# Patient Record
Sex: FEMALE | Race: WHITE | NOT HISPANIC OR LATINO
[De-identification: names, ages, dates, MRNs, and addresses within clinical notes are randomized per-mention and may not be internally consistent; named-entity substitution may affect disease eponyms.]

---

## 2018-02-09 ENCOUNTER — TRANSCRIPTION ENCOUNTER (OUTPATIENT)
Age: 38
End: 2018-02-09

## 2018-02-27 ENCOUNTER — APPOINTMENT (OUTPATIENT)
Dept: SURGERY | Facility: CLINIC | Age: 38
End: 2018-02-27
Payer: SELF-PAY

## 2018-02-27 VITALS
DIASTOLIC BLOOD PRESSURE: 96 MMHG | WEIGHT: 243.8 LBS | SYSTOLIC BLOOD PRESSURE: 150 MMHG | BODY MASS INDEX: 39.18 KG/M2 | HEIGHT: 66 IN

## 2018-02-27 PROCEDURE — SI006: CPT

## 2018-03-17 ENCOUNTER — TRANSCRIPTION ENCOUNTER (OUTPATIENT)
Age: 38
End: 2018-03-17

## 2018-03-23 ENCOUNTER — APPOINTMENT (OUTPATIENT)
Dept: SURGERY | Facility: CLINIC | Age: 38
End: 2018-03-23
Payer: COMMERCIAL

## 2018-03-23 VITALS
DIASTOLIC BLOOD PRESSURE: 90 MMHG | HEIGHT: 66 IN | WEIGHT: 240 LBS | BODY MASS INDEX: 38.57 KG/M2 | SYSTOLIC BLOOD PRESSURE: 146 MMHG

## 2018-03-23 DIAGNOSIS — Z87.01 PERSONAL HISTORY OF PNEUMONIA (RECURRENT): ICD-10-CM

## 2018-03-23 DIAGNOSIS — Z86.39 PERSONAL HISTORY OF OTHER ENDOCRINE, NUTRITIONAL AND METABOLIC DISEASE: ICD-10-CM

## 2018-03-23 PROCEDURE — 99244 OFF/OP CNSLTJ NEW/EST MOD 40: CPT

## 2018-03-23 RX ORDER — ALPRAZOLAM 1 MG/1
1 TABLET ORAL
Refills: 0 | Status: ACTIVE | COMMUNITY

## 2018-03-24 ENCOUNTER — OUTPATIENT (OUTPATIENT)
Dept: OUTPATIENT SERVICES | Facility: HOSPITAL | Age: 38
LOS: 1 days | Discharge: HOME | End: 2018-03-24

## 2018-03-24 DIAGNOSIS — R53.83 OTHER FATIGUE: ICD-10-CM

## 2018-03-24 DIAGNOSIS — E66.01 MORBID (SEVERE) OBESITY DUE TO EXCESS CALORIES: ICD-10-CM

## 2018-03-24 DIAGNOSIS — I10 ESSENTIAL (PRIMARY) HYPERTENSION: ICD-10-CM

## 2018-03-24 DIAGNOSIS — R16.0 HEPATOMEGALY, NOT ELSEWHERE CLASSIFIED: ICD-10-CM

## 2018-03-24 DIAGNOSIS — E07.9 DISORDER OF THYROID, UNSPECIFIED: ICD-10-CM

## 2018-03-27 ENCOUNTER — RESULT REVIEW (OUTPATIENT)
Age: 38
End: 2018-03-27

## 2018-03-27 LAB
25(OH)D3 SERPL-MCNC: 7.9 NG/ML
ALBUMIN SERPL ELPH-MCNC: 4.8 G/DL
ALP BLD-CCNC: 89 U/L
ALT SERPL-CCNC: 48 U/L
ANION GAP SERPL CALC-SCNC: 13 MMOL/L
AST SERPL-CCNC: 32 U/L
BASOPHILS # BLD AUTO: 0.02 K/UL
BASOPHILS NFR BLD AUTO: 0.2 %
BILIRUB SERPL-MCNC: 0.6 MG/DL
BUN SERPL-MCNC: 12 MG/DL
CALCIUM SERPL-MCNC: 10.1 MG/DL
CALCIUM SERPL-MCNC: 9.5 MG/DL
CHLORIDE SERPL-SCNC: 101 MMOL/L
CHOLEST SERPL-MCNC: 187 MG/DL
CHOLEST/HDLC SERPL: 4.2 RATIO
CO2 SERPL-SCNC: 26 MMOL/L
CREAT SERPL-MCNC: 0.8 MG/DL
EOSINOPHIL # BLD AUTO: 0.16 K/UL
EOSINOPHIL NFR BLD AUTO: 1.8 %
FERRITIN SERPL-MCNC: 51 NG/ML
FOLATE RBC-MCNC: 1011 NG/ML
GLUCOSE SERPL-MCNC: 101 MG/DL
HCT VFR BLD CALC: 45 %
HCT VFR BLD CALC: 46.8 %
HDLC SERPL-MCNC: 45 MG/DL
HGB BLD-MCNC: 14.3 G/DL
IMM GRANULOCYTES NFR BLD AUTO: 0.2 %
IRON SATN MFR SERPL: 30 %
IRON SERPL-MCNC: 103 UG/DL
LDLC SERPL CALC-MCNC: 124 MG/DL
LYMPHOCYTES # BLD AUTO: 2.97 K/UL
LYMPHOCYTES NFR BLD AUTO: 32.5 %
MAN DIFF?: NORMAL
MCHC RBC-ENTMCNC: 29.1 PG
MCHC RBC-ENTMCNC: 30.6 GM/DL
MCV RBC AUTO: 95.1 FL
MONOCYTES # BLD AUTO: 0.61 K/UL
MONOCYTES NFR BLD AUTO: 6.7 %
NEUTROPHILS # BLD AUTO: 5.36 K/UL
NEUTROPHILS NFR BLD AUTO: 58.6 %
PARATHYROID HORMONE INTACT: 63 PG/ML
PLATELET # BLD AUTO: 281 K/UL
POTASSIUM SERPL-SCNC: 4.9 MMOL/L
PROT SERPL-MCNC: 7.5 G/DL
RBC # BLD: 4.92 M/UL
RBC # FLD: 14.1 %
SODIUM SERPL-SCNC: 140 MMOL/L
T3FREE SERPL-MCNC: 3.56 PG/ML
T4 FREE SERPL-MCNC: 1.2 NG/DL
TIBC SERPL-MCNC: 338 UG/DL
TRIGL SERPL-MCNC: 183 MG/DL
TSH SERPL-ACNC: 3 UIU/ML
UIBC SERPL-MCNC: 235 UG/DL
VIT B12 SERPL-MCNC: 605 PG/ML
WBC # FLD AUTO: 9.14 K/UL

## 2018-03-28 ENCOUNTER — OTHER (OUTPATIENT)
Age: 38
End: 2018-03-28

## 2018-04-02 LAB — VIT B1 SERPL-MCNC: 158.2 NMOL/L

## 2018-04-03 ENCOUNTER — APPOINTMENT (OUTPATIENT)
Dept: SURGERY | Facility: CLINIC | Age: 38
End: 2018-04-03

## 2018-06-11 ENCOUNTER — OTHER (OUTPATIENT)
Age: 38
End: 2018-06-11

## 2018-06-27 ENCOUNTER — OTHER (OUTPATIENT)
Age: 38
End: 2018-06-27

## 2018-06-27 ENCOUNTER — APPOINTMENT (OUTPATIENT)
Dept: SURGERY | Facility: CLINIC | Age: 38
End: 2018-06-27

## 2018-06-27 ENCOUNTER — APPOINTMENT (OUTPATIENT)
Dept: SURGERY | Facility: CLINIC | Age: 38
End: 2018-06-27
Payer: COMMERCIAL

## 2018-06-27 VITALS — WEIGHT: 240 LBS | BODY MASS INDEX: 38.57 KG/M2 | HEIGHT: 66 IN

## 2018-06-27 PROCEDURE — 97802 MEDICAL NUTRITION INDIV IN: CPT

## 2018-07-17 ENCOUNTER — APPOINTMENT (OUTPATIENT)
Dept: SURGERY | Facility: CLINIC | Age: 38
End: 2018-07-17
Payer: COMMERCIAL

## 2018-07-17 VITALS — HEIGHT: 66 IN | BODY MASS INDEX: 38.09 KG/M2 | WEIGHT: 237 LBS

## 2018-07-17 PROCEDURE — 99212 OFFICE O/P EST SF 10 MIN: CPT

## 2018-07-30 ENCOUNTER — RX RENEWAL (OUTPATIENT)
Age: 38
End: 2018-07-30

## 2018-11-21 ENCOUNTER — TRANSCRIPTION ENCOUNTER (OUTPATIENT)
Age: 38
End: 2018-11-21

## 2018-12-11 ENCOUNTER — OTHER (OUTPATIENT)
Age: 38
End: 2018-12-11

## 2018-12-18 ENCOUNTER — OUTPATIENT (OUTPATIENT)
Dept: OUTPATIENT SERVICES | Facility: HOSPITAL | Age: 38
LOS: 1 days | Discharge: HOME | End: 2018-12-18

## 2018-12-18 ENCOUNTER — APPOINTMENT (OUTPATIENT)
Dept: SURGERY | Facility: CLINIC | Age: 38
End: 2018-12-18
Payer: COMMERCIAL

## 2018-12-18 VITALS — WEIGHT: 237 LBS | BODY MASS INDEX: 38.09 KG/M2 | HEIGHT: 66 IN

## 2018-12-18 DIAGNOSIS — E55.9 VITAMIN D DEFICIENCY, UNSPECIFIED: ICD-10-CM

## 2018-12-18 PROCEDURE — 99212 OFFICE O/P EST SF 10 MIN: CPT

## 2018-12-18 RX ORDER — ERGOCALCIFEROL 1.25 MG/1
1.25 MG CAPSULE, LIQUID FILLED ORAL
Qty: 4 | Refills: 3 | Status: DISCONTINUED | COMMUNITY
Start: 2018-03-27 | End: 2018-12-18

## 2018-12-18 RX ORDER — BUPROPION HYDROCHLORIDE 150 MG/1
150 TABLET, EXTENDED RELEASE ORAL DAILY
Refills: 0 | Status: DISCONTINUED | COMMUNITY
End: 2018-09-18

## 2018-12-21 ENCOUNTER — RESULT REVIEW (OUTPATIENT)
Age: 38
End: 2018-12-21

## 2018-12-21 LAB — 25(OH)D3 SERPL-MCNC: 19 NG/ML

## 2019-01-21 ENCOUNTER — OUTPATIENT (OUTPATIENT)
Dept: OUTPATIENT SERVICES | Facility: HOSPITAL | Age: 39
LOS: 1 days | Discharge: HOME | End: 2019-01-21

## 2019-01-21 VITALS
HEART RATE: 68 BPM | DIASTOLIC BLOOD PRESSURE: 78 MMHG | WEIGHT: 246.92 LBS | RESPIRATION RATE: 16 BRPM | HEIGHT: 66 IN | OXYGEN SATURATION: 100 % | SYSTOLIC BLOOD PRESSURE: 168 MMHG | TEMPERATURE: 97 F

## 2019-01-21 DIAGNOSIS — Z01.818 ENCOUNTER FOR OTHER PREPROCEDURAL EXAMINATION: ICD-10-CM

## 2019-01-21 DIAGNOSIS — Z98.890 OTHER SPECIFIED POSTPROCEDURAL STATES: Chronic | ICD-10-CM

## 2019-01-21 DIAGNOSIS — Z90.89 ACQUIRED ABSENCE OF OTHER ORGANS: Chronic | ICD-10-CM

## 2019-01-21 DIAGNOSIS — E66.01 MORBID (SEVERE) OBESITY DUE TO EXCESS CALORIES: ICD-10-CM

## 2019-01-21 LAB
ALBUMIN SERPL ELPH-MCNC: 5.1 G/DL — SIGNIFICANT CHANGE UP (ref 3.5–5.2)
ALP SERPL-CCNC: 110 U/L — SIGNIFICANT CHANGE UP (ref 30–115)
ALT FLD-CCNC: 51 U/L — HIGH (ref 0–41)
ANION GAP SERPL CALC-SCNC: 18 MMOL/L — HIGH (ref 7–14)
APPEARANCE UR: CLEAR — SIGNIFICANT CHANGE UP
APTT BLD: 34 SEC — SIGNIFICANT CHANGE UP (ref 27–39.2)
AST SERPL-CCNC: 48 U/L — HIGH (ref 0–41)
BASOPHILS # BLD AUTO: 0.04 K/UL — SIGNIFICANT CHANGE UP (ref 0–0.2)
BASOPHILS NFR BLD AUTO: 0.5 % — SIGNIFICANT CHANGE UP (ref 0–1)
BILIRUB SERPL-MCNC: 0.5 MG/DL — SIGNIFICANT CHANGE UP (ref 0.2–1.2)
BILIRUB UR-MCNC: NEGATIVE — SIGNIFICANT CHANGE UP
BLD GP AB SCN SERPL QL: SIGNIFICANT CHANGE UP
BUN SERPL-MCNC: 12 MG/DL — SIGNIFICANT CHANGE UP (ref 10–20)
CALCIUM SERPL-MCNC: 10.2 MG/DL — HIGH (ref 8.5–10.1)
CHLORIDE SERPL-SCNC: 96 MMOL/L — LOW (ref 98–110)
CO2 SERPL-SCNC: 23 MMOL/L — SIGNIFICANT CHANGE UP (ref 17–32)
COLOR SPEC: YELLOW — SIGNIFICANT CHANGE UP
CREAT SERPL-MCNC: 0.8 MG/DL — SIGNIFICANT CHANGE UP (ref 0.7–1.5)
DIFF PNL FLD: NEGATIVE — SIGNIFICANT CHANGE UP
EOSINOPHIL # BLD AUTO: 0.14 K/UL — SIGNIFICANT CHANGE UP (ref 0–0.7)
EOSINOPHIL NFR BLD AUTO: 1.7 % — SIGNIFICANT CHANGE UP (ref 0–8)
ESTIMATED AVERAGE GLUCOSE: 186 MG/DL — HIGH (ref 68–114)
GLUCOSE SERPL-MCNC: 101 MG/DL — HIGH (ref 70–99)
GLUCOSE UR QL: NEGATIVE MG/DL — SIGNIFICANT CHANGE UP
HBA1C BLD-MCNC: 8.1 % — HIGH (ref 4–5.6)
HCT VFR BLD CALC: 45.4 % — SIGNIFICANT CHANGE UP (ref 37–47)
HGB BLD-MCNC: 14.7 G/DL — SIGNIFICANT CHANGE UP (ref 12–16)
IMM GRANULOCYTES NFR BLD AUTO: 0.2 % — SIGNIFICANT CHANGE UP (ref 0.1–0.3)
INR BLD: 1 RATIO — SIGNIFICANT CHANGE UP (ref 0.65–1.3)
KETONES UR-MCNC: NEGATIVE — SIGNIFICANT CHANGE UP
LEUKOCYTE ESTERASE UR-ACNC: NEGATIVE — SIGNIFICANT CHANGE UP
LYMPHOCYTES # BLD AUTO: 2.81 K/UL — SIGNIFICANT CHANGE UP (ref 1.2–3.4)
LYMPHOCYTES # BLD AUTO: 33.5 % — SIGNIFICANT CHANGE UP (ref 20.5–51.1)
MCHC RBC-ENTMCNC: 27.9 PG — SIGNIFICANT CHANGE UP (ref 27–31)
MCHC RBC-ENTMCNC: 32.4 G/DL — SIGNIFICANT CHANGE UP (ref 32–37)
MCV RBC AUTO: 86.3 FL — SIGNIFICANT CHANGE UP (ref 81–99)
MONOCYTES # BLD AUTO: 0.58 K/UL — SIGNIFICANT CHANGE UP (ref 0.1–0.6)
MONOCYTES NFR BLD AUTO: 6.9 % — SIGNIFICANT CHANGE UP (ref 1.7–9.3)
NEUTROPHILS # BLD AUTO: 4.8 K/UL — SIGNIFICANT CHANGE UP (ref 1.4–6.5)
NEUTROPHILS NFR BLD AUTO: 57.2 % — SIGNIFICANT CHANGE UP (ref 42.2–75.2)
NITRITE UR-MCNC: NEGATIVE — SIGNIFICANT CHANGE UP
NRBC # BLD: 0 /100 WBCS — SIGNIFICANT CHANGE UP (ref 0–0)
PH UR: 6 — SIGNIFICANT CHANGE UP (ref 5–8)
PLATELET # BLD AUTO: 263 K/UL — SIGNIFICANT CHANGE UP (ref 130–400)
POTASSIUM SERPL-MCNC: 4.3 MMOL/L — SIGNIFICANT CHANGE UP (ref 3.5–5)
POTASSIUM SERPL-SCNC: 4.3 MMOL/L — SIGNIFICANT CHANGE UP (ref 3.5–5)
PROT SERPL-MCNC: 8 G/DL — SIGNIFICANT CHANGE UP (ref 6–8)
PROT UR-MCNC: NEGATIVE MG/DL — SIGNIFICANT CHANGE UP
PROTHROM AB SERPL-ACNC: 11.5 SEC — SIGNIFICANT CHANGE UP (ref 9.95–12.87)
RBC # BLD: 5.26 M/UL — SIGNIFICANT CHANGE UP (ref 4.2–5.4)
RBC # FLD: 12.7 % — SIGNIFICANT CHANGE UP (ref 11.5–14.5)
SODIUM SERPL-SCNC: 137 MMOL/L — SIGNIFICANT CHANGE UP (ref 135–146)
SP GR SPEC: 1.01 — SIGNIFICANT CHANGE UP (ref 1.01–1.03)
TYPE + AB SCN PNL BLD: SIGNIFICANT CHANGE UP
UROBILINOGEN FLD QL: 0.2 MG/DL — SIGNIFICANT CHANGE UP (ref 0.2–0.2)
WBC # BLD: 8.39 K/UL — SIGNIFICANT CHANGE UP (ref 4.8–10.8)
WBC # FLD AUTO: 8.39 K/UL — SIGNIFICANT CHANGE UP (ref 4.8–10.8)

## 2019-01-21 NOTE — H&P PST ADULT - MALLAMPATI CLASS
alessia crowded airway short thick neck/Class IV (difficult) - the soft palate is not visible at all

## 2019-01-21 NOTE — H&P PST ADULT - PMH
Anxiety    Diabetes  1 yr  GERD (gastroesophageal reflux disease)    HTN (hypertension)    Sleep apnea  on cpap  Thyroid disease  hashimotos no meds clinically euthyroid 1 yr

## 2019-01-21 NOTE — H&P PST ADULT - REASON FOR ADMISSION
37 yr old female to past for laparoscopic sleeve gastrectomy possible open procedure possible intraoperative endoscopy> Pt with h/o morbid obesity began bariatric eval last 1 yr now for this sx. Pt states no recent fever no cough no uri uti no c/o cp palp sob or pain at this time. Pt denies n/v/d or abd issues Pt states exercise tolerance is 2 flights no changes Pt dx with alessia has cpap inst to bring machine dos

## 2019-01-21 NOTE — H&P PST ADULT - OTHER CARE PROVIDERS
bariatric kt on chart pulm dr hurst sleep study alessia cpap in use cardio dr ward echo est on chart gi maile egd gerd

## 2019-01-22 DIAGNOSIS — I10 ESSENTIAL (PRIMARY) HYPERTENSION: ICD-10-CM

## 2019-01-22 DIAGNOSIS — E66.9 OBESITY, UNSPECIFIED: ICD-10-CM

## 2019-01-22 DIAGNOSIS — G47.30 SLEEP APNEA, UNSPECIFIED: ICD-10-CM

## 2019-01-22 DIAGNOSIS — K21.9 GASTRO-ESOPHAGEAL REFLUX DISEASE WITHOUT ESOPHAGITIS: ICD-10-CM

## 2019-01-22 DIAGNOSIS — I49.9 CARDIAC ARRHYTHMIA, UNSPECIFIED: ICD-10-CM

## 2019-01-23 ENCOUNTER — APPOINTMENT (OUTPATIENT)
Dept: SURGERY | Facility: CLINIC | Age: 39
End: 2019-01-23
Payer: COMMERCIAL

## 2019-01-23 VITALS
BODY MASS INDEX: 37.9 KG/M2 | HEIGHT: 66 IN | SYSTOLIC BLOOD PRESSURE: 128 MMHG | DIASTOLIC BLOOD PRESSURE: 76 MMHG | WEIGHT: 235.8 LBS

## 2019-01-23 DIAGNOSIS — Z86.79 PERSONAL HISTORY OF OTHER DISEASES OF THE CIRCULATORY SYSTEM: ICD-10-CM

## 2019-01-23 PROCEDURE — 99215 OFFICE O/P EST HI 40 MIN: CPT

## 2019-01-23 NOTE — REASON FOR VISIT
[Follow-Up Visit] : a follow-up visit for [Other___] : [unfilled] [FreeTextEntry2] : Patient presents for Preoperative Bariatric visit.

## 2019-01-23 NOTE — ASSESSMENT
[FreeTextEntry1] : NISA KEEN is a 38 year female seen today for Preoperative Bariatric visit. All medications were reviewed with the patient and instructions were given in respect to medications on the day of surgery.  Written instructions provided.\par

## 2019-01-23 NOTE — PLAN
[FreeTextEntry1] : PLAN:  UGI on 2/5/2019.\par             Return for postop visit.\par             Call with concerns.

## 2019-01-23 NOTE — ADDENDUM
[FreeTextEntry1] : Patient seen, examined and reviewed with practitioner.  I agree with the assessment and plan.\par Risks, benefits and alternatives to the procedure were discussed with the patient and questions answered.  The patient is ready to proceed with surgery.\par

## 2019-01-23 NOTE — HISTORY OF PRESENT ILLNESS
[de-identified] : She is scheduled for Laparoscopic Sleeve Gastrectomy on 1/28/2019.  The patient states that she has been overweight for several years and has tried multiple weight loss modalities in the past without any long-term sustainable weight loss.

## 2019-01-28 ENCOUNTER — RESULT REVIEW (OUTPATIENT)
Age: 39
End: 2019-01-28

## 2019-01-28 ENCOUNTER — INPATIENT (INPATIENT)
Facility: HOSPITAL | Age: 39
LOS: 0 days | Discharge: HOME | End: 2019-01-29
Attending: SURGERY | Admitting: SURGERY
Payer: MEDICAID

## 2019-01-28 ENCOUNTER — APPOINTMENT (OUTPATIENT)
Dept: SURGERY | Facility: HOSPITAL | Age: 39
End: 2019-01-28
Payer: COMMERCIAL

## 2019-01-28 VITALS
RESPIRATION RATE: 20 BRPM | TEMPERATURE: 98 F | HEART RATE: 88 BPM | WEIGHT: 235.01 LBS | SYSTOLIC BLOOD PRESSURE: 126 MMHG | DIASTOLIC BLOOD PRESSURE: 92 MMHG | HEIGHT: 66 IN

## 2019-01-28 DIAGNOSIS — Z98.890 OTHER SPECIFIED POSTPROCEDURAL STATES: Chronic | ICD-10-CM

## 2019-01-28 DIAGNOSIS — Z90.89 ACQUIRED ABSENCE OF OTHER ORGANS: Chronic | ICD-10-CM

## 2019-01-28 LAB
GLUCOSE BLDC GLUCOMTR-MCNC: 129 MG/DL — HIGH (ref 70–99)
GLUCOSE BLDC GLUCOMTR-MCNC: 139 MG/DL — HIGH (ref 70–99)
GLUCOSE BLDC GLUCOMTR-MCNC: 150 MG/DL — HIGH (ref 70–99)
GLUCOSE BLDC GLUCOMTR-MCNC: 159 MG/DL — HIGH (ref 70–99)

## 2019-01-28 PROCEDURE — 43775 LAP SLEEVE GASTRECTOMY: CPT

## 2019-01-28 PROCEDURE — 99223 1ST HOSP IP/OBS HIGH 75: CPT

## 2019-01-28 PROCEDURE — 43775 LAP SLEEVE GASTRECTOMY: CPT | Mod: 82

## 2019-01-28 RX ORDER — ONDANSETRON 8 MG/1
4 TABLET, FILM COATED ORAL ONCE
Refills: 0 | Status: COMPLETED | OUTPATIENT
Start: 2019-01-28 | End: 2019-01-28

## 2019-01-28 RX ORDER — SODIUM CHLORIDE 9 MG/ML
1000 INJECTION, SOLUTION INTRAVENOUS
Refills: 0 | Status: DISCONTINUED | OUTPATIENT
Start: 2019-01-28 | End: 2019-01-29

## 2019-01-28 RX ORDER — GLUCAGON INJECTION, SOLUTION 0.5 MG/.1ML
1 INJECTION, SOLUTION SUBCUTANEOUS ONCE
Refills: 0 | Status: DISCONTINUED | OUTPATIENT
Start: 2019-01-28 | End: 2019-01-29

## 2019-01-28 RX ORDER — KETOROLAC TROMETHAMINE 30 MG/ML
15 SYRINGE (ML) INJECTION EVERY 6 HOURS
Refills: 0 | Status: DISCONTINUED | OUTPATIENT
Start: 2019-01-28 | End: 2019-01-29

## 2019-01-28 RX ORDER — HEPARIN SODIUM 5000 [USP'U]/ML
5000 INJECTION INTRAVENOUS; SUBCUTANEOUS EVERY 8 HOURS
Refills: 0 | Status: DISCONTINUED | OUTPATIENT
Start: 2019-01-28 | End: 2019-01-29

## 2019-01-28 RX ORDER — DEXAMETHASONE 0.5 MG/5ML
4 ELIXIR ORAL ONCE
Refills: 0 | Status: COMPLETED | OUTPATIENT
Start: 2019-01-28 | End: 2019-01-28

## 2019-01-28 RX ORDER — PANTOPRAZOLE SODIUM 20 MG/1
40 TABLET, DELAYED RELEASE ORAL EVERY 24 HOURS
Refills: 0 | Status: DISCONTINUED | OUTPATIENT
Start: 2019-01-28 | End: 2019-01-29

## 2019-01-28 RX ORDER — INSULIN LISPRO 100/ML
VIAL (ML) SUBCUTANEOUS EVERY 6 HOURS
Refills: 0 | Status: DISCONTINUED | OUTPATIENT
Start: 2019-01-28 | End: 2019-01-29

## 2019-01-28 RX ORDER — ONDANSETRON 8 MG/1
4 TABLET, FILM COATED ORAL EVERY 8 HOURS
Refills: 0 | Status: DISCONTINUED | OUTPATIENT
Start: 2019-01-28 | End: 2019-01-28

## 2019-01-28 RX ORDER — ONDANSETRON 8 MG/1
4 TABLET, FILM COATED ORAL EVERY 6 HOURS
Refills: 0 | Status: DISCONTINUED | OUTPATIENT
Start: 2019-01-28 | End: 2019-01-29

## 2019-01-28 RX ORDER — NALOXONE HYDROCHLORIDE 4 MG/.1ML
0.1 SPRAY NASAL
Refills: 0 | Status: DISCONTINUED | OUTPATIENT
Start: 2019-01-28 | End: 2019-01-28

## 2019-01-28 RX ORDER — CEFOTETAN DISODIUM 1 G
2 VIAL (EA) INJECTION EVERY 12 HOURS
Refills: 0 | Status: COMPLETED | OUTPATIENT
Start: 2019-01-28 | End: 2019-01-29

## 2019-01-28 RX ORDER — BUPIVACAINE 13.3 MG/ML
20 INJECTION, SUSPENSION, LIPOSOMAL INFILTRATION ONCE
Refills: 0 | Status: DISCONTINUED | OUTPATIENT
Start: 2019-01-28 | End: 2019-01-28

## 2019-01-28 RX ORDER — HEPARIN SODIUM 5000 [USP'U]/ML
5000 INJECTION INTRAVENOUS; SUBCUTANEOUS ONCE
Refills: 0 | Status: COMPLETED | OUTPATIENT
Start: 2019-01-28 | End: 2019-01-28

## 2019-01-28 RX ORDER — MEPERIDINE HYDROCHLORIDE 50 MG/ML
12.5 INJECTION INTRAMUSCULAR; INTRAVENOUS; SUBCUTANEOUS
Refills: 0 | Status: DISCONTINUED | OUTPATIENT
Start: 2019-01-28 | End: 2019-01-28

## 2019-01-28 RX ORDER — DEXTROSE 50 % IN WATER 50 %
25 SYRINGE (ML) INTRAVENOUS ONCE
Refills: 0 | Status: DISCONTINUED | OUTPATIENT
Start: 2019-01-28 | End: 2019-01-29

## 2019-01-28 RX ORDER — MORPHINE SULFATE 50 MG/1
30 CAPSULE, EXTENDED RELEASE ORAL
Refills: 0 | Status: DISCONTINUED | OUTPATIENT
Start: 2019-01-28 | End: 2019-01-29

## 2019-01-28 RX ORDER — DEXTROSE 50 % IN WATER 50 %
15 SYRINGE (ML) INTRAVENOUS ONCE
Refills: 0 | Status: DISCONTINUED | OUTPATIENT
Start: 2019-01-28 | End: 2019-01-29

## 2019-01-28 RX ORDER — ACETAMINOPHEN 500 MG
1000 TABLET ORAL ONCE
Refills: 0 | Status: DISCONTINUED | OUTPATIENT
Start: 2019-01-28 | End: 2019-01-28

## 2019-01-28 RX ORDER — HYDROMORPHONE HYDROCHLORIDE 2 MG/ML
0.5 INJECTION INTRAMUSCULAR; INTRAVENOUS; SUBCUTANEOUS ONCE
Refills: 0 | Status: DISCONTINUED | OUTPATIENT
Start: 2019-01-28 | End: 2019-01-28

## 2019-01-28 RX ADMIN — HYDROMORPHONE HYDROCHLORIDE 0.5 MILLIGRAM(S): 2 INJECTION INTRAMUSCULAR; INTRAVENOUS; SUBCUTANEOUS at 11:12

## 2019-01-28 RX ADMIN — HEPARIN SODIUM 5000 UNIT(S): 5000 INJECTION INTRAVENOUS; SUBCUTANEOUS at 15:56

## 2019-01-28 RX ADMIN — PANTOPRAZOLE SODIUM 40 MILLIGRAM(S): 20 TABLET, DELAYED RELEASE ORAL at 11:51

## 2019-01-28 RX ADMIN — SODIUM CHLORIDE 125 MILLILITER(S): 9 INJECTION, SOLUTION INTRAVENOUS at 09:49

## 2019-01-28 RX ADMIN — ONDANSETRON 4 MILLIGRAM(S): 8 TABLET, FILM COATED ORAL at 10:25

## 2019-01-28 RX ADMIN — Medication 100 GRAM(S): at 20:11

## 2019-01-28 RX ADMIN — Medication 4 MILLIGRAM(S): at 11:10

## 2019-01-28 RX ADMIN — MORPHINE SULFATE 30 MILLILITER(S): 50 CAPSULE, EXTENDED RELEASE ORAL at 10:14

## 2019-01-28 RX ADMIN — HYDROMORPHONE HYDROCHLORIDE 0.5 MILLIGRAM(S): 2 INJECTION INTRAMUSCULAR; INTRAVENOUS; SUBCUTANEOUS at 11:15

## 2019-01-28 RX ADMIN — HEPARIN SODIUM 5000 UNIT(S): 5000 INJECTION INTRAVENOUS; SUBCUTANEOUS at 07:27

## 2019-01-28 RX ADMIN — SODIUM CHLORIDE 125 MILLILITER(S): 9 INJECTION, SOLUTION INTRAVENOUS at 17:00

## 2019-01-28 RX ADMIN — ONDANSETRON 4 MILLIGRAM(S): 8 TABLET, FILM COATED ORAL at 16:32

## 2019-01-28 RX ADMIN — HEPARIN SODIUM 5000 UNIT(S): 5000 INJECTION INTRAVENOUS; SUBCUTANEOUS at 22:55

## 2019-01-28 NOTE — BRIEF OPERATIVE NOTE - PROCEDURE
<<-----Click on this checkbox to enter Procedure Laparoscopic sleeve gastrectomy  01/28/2019    Active  ADEMIN1

## 2019-01-28 NOTE — ASU PATIENT PROFILE, ADULT - PSH
History of esophagogastroduodenoscopy (EGD)  2018  History of surgery  liposuction  10 yrs ago  History of tonsillectomy  1983

## 2019-01-28 NOTE — ASU PREOP CHECKLIST - PATIENT'S PERSONAL PROPERTY GIVEN TO
mom/family member mom-cpap [id number] to clinical/family member mom-cpap s/[id number] to clinical/ locker 17/family member

## 2019-01-28 NOTE — CONSULT NOTE ADULT - SUBJECTIVE AND OBJECTIVE BOX
NISA KEEN  [id number]  38y Female With past medical/surgical hx as below, BMI 38, POD 0 s/p uncomplicated laparoscopic sleeve gastrectomy. OR case 1.5 hrs, EBL 5. Upgraded to SICU for close monitoring in the setting of possible post op complications.    Indication for ICU admission:  Admit Date: 1/28  ICU Date: 1/28  OR Date: 1/28    No Known Allergies    PAST MEDICAL & SURGICAL HISTORY:  Thyroid disease: hashimotos no meds clinically euthyroid 1 yr  GERD (gastroesophageal reflux disease)  Anxiety  HTN (hypertension)  Sleep apnea: on cpap  Diabetes: 1 yr  History of esophagogastroduodenoscopy (EGD): 2018  History of surgery: liposuction  10 yrs ago  History of tonsillectomy: 1983    Home Medications:  metFORMIN: 500 mg po q day (28 Jan 2019 06:08)  vitamin d: once a day (28 Jan 2019 06:08)  Wellbutrin: orally once a day 150 mg po  (28 Jan 2019 06:08)  Xanax 1 mg oral tablet: 1 tab(s) orally once a day, As Needed (28 Jan 2019 06:08)    DVT PTX: hep 5000 q8h     GI PTX: Protonix    ***Tubes/Lines/Drains  ***  Peripheral IV      REVIEW OF SYSTEMS    [x ] A ten-point review of systems was otherwise negative except as noted.  [ ] Due to altered mental status/intubation, subjective information were not able to be obtained from the patient. History was obtained, to the extent possible, from review of the chart and collateral sources of information.    Daily Height in cm: 167.64 (28 Jan 2019 07:28)    Daily     Diet, NPO (01-28-19 @ 10:01)      CURRENT MEDS:  Neurologic Medications  HYDROmorphone  Injectable 0.5 milliGRAM(s) IV Push once  meperidine     Injectable 12.5 milliGRAM(s) IV Push every 10 minutes PRN Shivering  morphine PCA (5 mG/mL) 30 milliLiter(s) PCA Continuous PCA Continuous  ondansetron Injectable 4 milliGRAM(s) IV Push every 6 hours PRN Nausea    Respiratory Medications    Cardiovascular Medications    Gastrointestinal Medications  dextrose 5%. 1000 milliLiter(s) IV Continuous <Continuous>  lactated ringers. 1000 milliLiter(s) IV Continuous <Continuous>  lactated ringers. 1000 milliLiter(s) IV Continuous <Continuous>  pantoprazole  Injectable 40 milliGRAM(s) IV Push every 24 hours    Genitourinary Medications    Hematologic/Oncologic Medications  heparin  Injectable 5000 Unit(s) SubCutaneous every 8 hours    Antimicrobial/Immunologic Medications  cefoTEtan  IVPB 2 Gram(s) IV Intermittent every 12 hours    Endocrine/Metabolic Medications  dexamethasone  Injectable 4 milliGRAM(s) IV Push once  dextrose 40% Gel 15 Gram(s) Oral once PRN Blood Glucose LESS THAN 70 milliGRAM(s)/deciliter  dextrose 50% Injectable 25 Gram(s) IV Push once  glucagon  Injectable 1 milliGRAM(s) IntraMuscular once PRN Glucose LESS THAN 70 milligrams/deciliter  insulin lispro (HumaLOG) corrective regimen sliding scale   SubCutaneous every 6 hours    Topical/Other Medications  naloxone Injectable 0.1 milliGRAM(s) IV Push every 3 minutes PRN For ANY of the following changes in patient status:  A. RR LESS THAN 10 breaths per minute, B. Oxygen saturation LESS THAN 90%, C. Sedation score of 6      ICU Vital Signs Last 24 Hrs  T(C): 36.5 (28 Jan 2019 07:28), Max: 36.5 (28 Jan 2019 06:09)  T(F): 97.7 (28 Jan 2019 06:09), Max: 97.7 (28 Jan 2019 06:09)  HR: 88 (28 Jan 2019 07:28) (88 - 88)  BP: 126/92 (28 Jan 2019 07:28) (126/92 - 126/92)    PHYSICAL EXAM:    General/Neuro: GCS 15   Lungs: CTAB  Cardiovascular : S1, S2.  Regular rate and rhythm.    Cardiac Rhythm: Normal Sinus Rhythm    GI: Abdomen soft, NT, distended, incision site c/d/i     Extremities: Extremities warm, pink, well-perfused.     Derm: Good skin turgor, no skin breakdown.        CXR:     LABS:  CAPILLARY BLOOD GLUCOSE      POCT Blood Glucose.: 139 mg/dL (28 Jan 2019 05:42)

## 2019-01-28 NOTE — CHART NOTE - NSCHARTNOTEFT_GEN_A_CORE
PACU ANESTHESIA ADMISSION NOTE      Procedure: Laparoscopic sleeve gastrectomy, lysis of adhesions  Post op diagnosis:  Morbid obesity due to excess calories      ____  Intubated  TV:______       Rate: ______      FiO2: ______    _x___  Patent Airway    __x__  Full return of protective reflexes    __x__  Full recovery from anesthesia / back to baseline     Vitals:   T:  97.4F         R:   12               BP:    150/98              Sat:    98               P: 90      Mental Status:  __x__ Awake   ___x__ Alert   _____ Drowsy   _____ Sedated    Nausea/Vomiting:  __x__ NO  ______Yes,   See Post - Op Orders          Pain Scale (0-10):  __4/10__    Treatment: ____ None    _x___ See Post - Op/PCA Orders    Post - Operative Fluids:   ____ Oral   __x__ See Post - Op Orders    Plan: Discharge:   ____Home       _____Floor     __x___Critical Care    _____  Other:_________________    Comments: pt tolerated procedure well, no anesthesia related complications. Care of pt endorsed to PACU/ ICU, reports given to PACU RN, ICU NP.

## 2019-01-28 NOTE — CONSULT NOTE ADULT - ASSESSMENT
ASSESSMENT/PLAN: 38yFemale with pertinent hx of DANIEL on CPAP, obesity BMI 38     Neurologic:  Pain Control: Morphine PCA    Respiratory:  - No acute issues  - Maintain O2 sat >94%  - Is/PT    Cardiovascular:  Hx HTN:  - will restart home meds once tolerating PO***  - Monitor for Tachycardia    Gastrointestinal/Nutrition:  - Strict NPO   - Bowel regimen  - PPI    Genitourinary/Renal:  - TOV -6pm  - Monitor lytes and replete    Hematologic:  - No acute issues  - Hep SQ  - SCD     Infectious Disease:  - No acute issues    Endocrine:  Hx of DM:  - ISS , FS q6h     Lines: PIVs    Disposition:  SICU ASSESSMENT/PLAN: 38yFemale with pertinent hx of DANIEL on CPAP, obesity BMI 38     Neurologic:  Pain Control: Morphine PCA    Respiratory:  DANIEL  - CPAP at night (6)  - Maintain O2 sat >94%  - Is/PT    Cardiovascular:  Hx HTN:  - Not on home meds  - Monitor for Tachycardia, maintain normotensive    Gastrointestinal/Nutrition:  - Strict NPO   - Bowel regimen  - PPI    Genitourinary/Renal:  - TOV -6pm  - Monitor lytes and replete  - LR@125    Hematologic:  - No acute issues  - Hep SQ  - SCD     Infectious Disease:  - No acute issues    Endocrine:  Hx of DM:  - ISS , FS q6h     Lines: PIVs    Disposition:  SICU

## 2019-01-28 NOTE — CHART NOTE - NSCHARTNOTEFT_GEN_A_CORE
Post Operative Check    Patient is post op from a Laparoscopic sleeve gastrectomy ([id number])   and is lying comfortably in bed, pain control with PCA pump, ambulated, voiding, pulling in 1500-2000ml on incentive spirometer    Vitals    T(C): 37.1 (01-28-19 @ 17:15), Max: 37.1 (01-28-19 @ 17:15)  HR: 87 (01-28-19 @ 19:00) (80 - 92)  BP: 147/82 (01-28-19 @ 19:00) (126/92 - 183/103)  RR: 18 (01-28-19 @ 19:00) (11 - 24)  SpO2: 96% (01-28-19 @ 19:00) (95% - 100%)  Wt(kg): --      01-28 @ 07:01  -  01-28 @ 21:22  --------------------------------------------------------  IN:    lactated ringers.: 895 mL    lactated ringers.: 250 mL  Total IN: 1145 mL    OUT:    Voided: 575 mL  Total OUT: 575 mL    Total NET: 570 mL          Labs          Physical Exam  General: NAD AAOx3   Cards: RRR S1S2  Resp: CTAB  Abdomen:SOFT NONTENDER, surgical site clean dry and intact      Patient is a 38y old Female s/p laparoscopic sleeve   plan:  NPO   Iv hydration   dvt and gi prophylaxis   encourage incentive

## 2019-01-28 NOTE — CHART NOTE - NSCHARTNOTEFT_GEN_A_CORE
PACU RN asked to access pain and Nausea in postop Gastric sleeve patient  Patient  states pain 8-9/10 even after PCA 7 mg iv over 1 1/2 hr ago   Discussed with DR ALVA will give Decadron 4mg for nausea  Patient with severe pain will TX with Dilaudid 0.5mg now   End Tidal Co2 monitoring ordered for PACU as Pt. has HX DANIEL

## 2019-01-29 ENCOUNTER — TRANSCRIPTION ENCOUNTER (OUTPATIENT)
Age: 39
End: 2019-01-29

## 2019-01-29 VITALS
TEMPERATURE: 97 F | DIASTOLIC BLOOD PRESSURE: 58 MMHG | HEART RATE: 65 BPM | SYSTOLIC BLOOD PRESSURE: 125 MMHG | RESPIRATION RATE: 18 BRPM

## 2019-01-29 LAB
ANION GAP SERPL CALC-SCNC: 15 MMOL/L — HIGH (ref 7–14)
APTT BLD: 30.2 SEC — SIGNIFICANT CHANGE UP (ref 27–39.2)
BASOPHILS # BLD AUTO: 0.02 K/UL — SIGNIFICANT CHANGE UP (ref 0–0.2)
BASOPHILS NFR BLD AUTO: 0.2 % — SIGNIFICANT CHANGE UP (ref 0–1)
BUN SERPL-MCNC: 11 MG/DL — SIGNIFICANT CHANGE UP (ref 10–20)
CALCIUM SERPL-MCNC: 9.2 MG/DL — SIGNIFICANT CHANGE UP (ref 8.5–10.1)
CHLORIDE SERPL-SCNC: 99 MMOL/L — SIGNIFICANT CHANGE UP (ref 98–110)
CO2 SERPL-SCNC: 24 MMOL/L — SIGNIFICANT CHANGE UP (ref 17–32)
CREAT SERPL-MCNC: 0.9 MG/DL — SIGNIFICANT CHANGE UP (ref 0.7–1.5)
EOSINOPHIL # BLD AUTO: 0.01 K/UL — SIGNIFICANT CHANGE UP (ref 0–0.7)
EOSINOPHIL NFR BLD AUTO: 0.1 % — SIGNIFICANT CHANGE UP (ref 0–8)
ESTIMATED AVERAGE GLUCOSE: 186 MG/DL — HIGH (ref 68–114)
GLUCOSE BLDC GLUCOMTR-MCNC: 102 MG/DL — HIGH (ref 70–99)
GLUCOSE BLDC GLUCOMTR-MCNC: 102 MG/DL — HIGH (ref 70–99)
GLUCOSE SERPL-MCNC: 132 MG/DL — HIGH (ref 70–99)
HBA1C BLD-MCNC: 8.1 % — HIGH (ref 4–5.6)
HCT VFR BLD CALC: 39.3 % — SIGNIFICANT CHANGE UP (ref 37–47)
HGB BLD-MCNC: 13 G/DL — SIGNIFICANT CHANGE UP (ref 12–16)
IMM GRANULOCYTES NFR BLD AUTO: 0.2 % — SIGNIFICANT CHANGE UP (ref 0.1–0.3)
INR BLD: 1.09 RATIO — SIGNIFICANT CHANGE UP (ref 0.65–1.3)
LYMPHOCYTES # BLD AUTO: 1.32 K/UL — SIGNIFICANT CHANGE UP (ref 1.2–3.4)
LYMPHOCYTES # BLD AUTO: 14.5 % — LOW (ref 20.5–51.1)
MAGNESIUM SERPL-MCNC: 2 MG/DL — SIGNIFICANT CHANGE UP (ref 1.8–2.4)
MCHC RBC-ENTMCNC: 28.7 PG — SIGNIFICANT CHANGE UP (ref 27–31)
MCHC RBC-ENTMCNC: 33.1 G/DL — SIGNIFICANT CHANGE UP (ref 32–37)
MCV RBC AUTO: 86.8 FL — SIGNIFICANT CHANGE UP (ref 81–99)
MONOCYTES # BLD AUTO: 0.59 K/UL — SIGNIFICANT CHANGE UP (ref 0.1–0.6)
MONOCYTES NFR BLD AUTO: 6.5 % — SIGNIFICANT CHANGE UP (ref 1.7–9.3)
NEUTROPHILS # BLD AUTO: 7.14 K/UL — HIGH (ref 1.4–6.5)
NEUTROPHILS NFR BLD AUTO: 78.5 % — HIGH (ref 42.2–75.2)
NRBC # BLD: 0 /100 WBCS — SIGNIFICANT CHANGE UP (ref 0–0)
PHOSPHATE SERPL-MCNC: 3.3 MG/DL — SIGNIFICANT CHANGE UP (ref 2.1–4.9)
PLATELET # BLD AUTO: 248 K/UL — SIGNIFICANT CHANGE UP (ref 130–400)
POTASSIUM SERPL-MCNC: 4.6 MMOL/L — SIGNIFICANT CHANGE UP (ref 3.5–5)
POTASSIUM SERPL-SCNC: 4.6 MMOL/L — SIGNIFICANT CHANGE UP (ref 3.5–5)
PROTHROM AB SERPL-ACNC: 12.5 SEC — SIGNIFICANT CHANGE UP (ref 9.95–12.87)
RBC # BLD: 4.53 M/UL — SIGNIFICANT CHANGE UP (ref 4.2–5.4)
RBC # FLD: 12.7 % — SIGNIFICANT CHANGE UP (ref 11.5–14.5)
SODIUM SERPL-SCNC: 138 MMOL/L — SIGNIFICANT CHANGE UP (ref 135–146)
WBC # BLD: 9.1 K/UL — SIGNIFICANT CHANGE UP (ref 4.8–10.8)
WBC # FLD AUTO: 9.1 K/UL — SIGNIFICANT CHANGE UP (ref 4.8–10.8)

## 2019-01-29 PROCEDURE — 99232 SBSQ HOSP IP/OBS MODERATE 35: CPT | Mod: 24

## 2019-01-29 RX ORDER — ONDANSETRON 8 MG/1
4 TABLET, FILM COATED ORAL EVERY 4 HOURS
Refills: 0 | Status: DISCONTINUED | OUTPATIENT
Start: 2019-01-29 | End: 2019-01-29

## 2019-01-29 RX ADMIN — ONDANSETRON 4 MILLIGRAM(S): 8 TABLET, FILM COATED ORAL at 10:33

## 2019-01-29 RX ADMIN — Medication 15 MILLIGRAM(S): at 07:24

## 2019-01-29 RX ADMIN — Medication 100 GRAM(S): at 07:30

## 2019-01-29 RX ADMIN — Medication 15 MILLIGRAM(S): at 07:42

## 2019-01-29 RX ADMIN — PANTOPRAZOLE SODIUM 40 MILLIGRAM(S): 20 TABLET, DELAYED RELEASE ORAL at 10:53

## 2019-01-29 RX ADMIN — HEPARIN SODIUM 5000 UNIT(S): 5000 INJECTION INTRAVENOUS; SUBCUTANEOUS at 13:55

## 2019-01-29 RX ADMIN — Medication 15 MILLIGRAM(S): at 14:07

## 2019-01-29 RX ADMIN — HEPARIN SODIUM 5000 UNIT(S): 5000 INJECTION INTRAVENOUS; SUBCUTANEOUS at 05:53

## 2019-01-29 RX ADMIN — Medication 15 MILLIGRAM(S): at 13:52

## 2019-01-29 RX ADMIN — ONDANSETRON 4 MILLIGRAM(S): 8 TABLET, FILM COATED ORAL at 13:54

## 2019-01-29 NOTE — DISCHARGE NOTE ADULT - CARE PROVIDER_API CALL
Jess Mederos), Surgery  [address] [place] [address]  [address] [place] [address]  Phone: [phone]  Fax: [phone]

## 2019-01-29 NOTE — PROGRESS NOTE ADULT - SUBJECTIVE AND OBJECTIVE BOX
GENERAL SURGERY PROGRESS NOTE     NISA KEEN  38y  Female  Hospital day :1d  POD:1  Procedure:Laparoscopic sleeve gastrectomy    OVERNIGHT EVENTS: no acute events overnight, patient has been ambulating and voiding on her own. Using incentive spirometer.    T(F): 97.9 (01-28-19 @ 23:00), Max: 98.7 (01-28-19 @ 17:15)  HR: 80 (01-29-19 @ 01:00) (71 - 92)  BP: 123/60 (01-29-19 @ 01:00) (119/60 - 183/103)  RR: 17 (01-29-19 @ 01:00) (11 - 24)  SpO2: 96% (01-29-19 @ 01:00) (95% - 100%)    DIET/FLUIDS: dextrose 5%. 1000 milliLiter(s) IV Continuous <Continuous>  lactated ringers. 1000 milliLiter(s) IV Continuous <Continuous>  lactated ringers. 1000 milliLiter(s) IV Continuous <Continuous>         GI proph:  pantoprazole  Injectable 40 milliGRAM(s) IV Push every 24 hours    AC/ proph: heparin  Injectable 5000 Unit(s) SubCutaneous every 8 hours    ABx: cefoTEtan  IVPB 2 Gram(s) IV Intermittent every 12 hours      PHYSICAL EXAM:  GENERAL: NAD, well-appearing  CHEST/LUNG: Clear to auscultation bilaterally  HEART: Regular rate and rhythm  ABDOMEN: Soft, Nontender, Nondistended, surgical site clean dry and intact         LABS  Labs:  CAPILLARY BLOOD GLUCOSE      POCT Blood Glucose.: 129 mg/dL (28 Jan 2019 23:07)  POCT Blood Glucose.: 159 mg/dL (28 Jan 2019 18:17)  POCT Blood Glucose.: 150 mg/dL (28 Jan 2019 12:37)  POCT Blood Glucose.: 139 mg/dL (28 Jan 2019 05:42)                          13.0   9.10  )-----------( 248      ( 28 Jan 2019 23:30 )             39.3       Auto Neutrophil %: 78.5 % (01-28-19 @ 23:30)  Auto Immature Granulocyte %: 0.2 % (01-28-19 @ 23:30)    01-28    138  |  99  |  11  ----------------------------<  132<H>  4.6   |  24  |  0.9      Magnesium, Serum: 2.0 mg/dL (01-28-19 @ 23:30)           12.50  ----< 1.09    ( 28 Jan 2019 23:30 )     30.2

## 2019-01-29 NOTE — DISCHARGE NOTE ADULT - MEDICATION SUMMARY - MEDICATIONS TO TAKE
I will START or STAY ON the medications listed below when I get home from the hospital:    vitamin d  -- once a day  -- Indication: For as per bariatric surgery    metFORMIN  -- 500 mg po q day  -- Indication: For as per bariatric surgery    Xanax 1 mg oral tablet  -- 1 tab(s) by mouth once a day, As Needed  -- Indication: For as per bariatric surgery    Wellbutrin  -- orally once a day 150 mg po   -- Indication: For as per bariatric surgery

## 2019-01-29 NOTE — CHART NOTE - NSCHARTNOTEFT_GEN_A_CORE
SICU Transfer Note:    37y/o f with pertinent pmhx of morbid obesity (BMI 38), DANIEL on CPAP POD 1 s/p laparoscopic sleeve gastrectomy    Neuro:  Pain Control: Morphine PCA, Toradol    CV:   - No acute issues  - Normotensive, non tachycardic    Resp:   DANIEL  - CPAP (5)  - IS/PT/OOB    GI:  - Mikael clear diet started on 1/29  - Abdomen soft, dressing c/di  - Zofran/PPI    Renal:   - Voiding  - Lytes repleted  - LR@125    Heme:   - Hep 5000 q8h  - H/H stable    ID:   - Afebrile  - Completed Cefotetan    Endo:  Hx of DM:  - ISS    Signed out to: SICU Transfer Note:    39y/o f with pertinent pmhx of morbid obesity (BMI 38), DANIEL on CPAP POD 1 s/p laparoscopic sleeve gastrectomy    Neuro:  Pain Control: Morphine PCA, Toradol    CV:   - No acute issues  - Normotensive, non tachycardic    Resp:   DANIEL  - CPAP (5)  - IS/PT/OOB    GI:  - Mikael clear diet started on 1/29  - Abdomen soft, dressing c/di  - Zofran/PPI    Renal:   - Voiding  - Lytes repleted  - LR@125    Heme:   - Hep 5000 q8h  - H/H stable    ID:   - Afebrile  - Completed Cefotetan    Endo:  Hx of DM:  - ISS    Signed out to: Kaelyn QUEZADA at 12:53

## 2019-01-29 NOTE — DISCHARGE NOTE ADULT - ADDITIONAL INSTRUCTIONS
follow up with Dr Mederos as scheduled  if experience fever, shortness of breath, chest pain, dizziness, vomiting or bleeding or drainage from wound call MD or return to ED

## 2019-01-29 NOTE — CHART NOTE - NSCHARTNOTEFT_GEN_A_CORE
pt seen without complaints, tolerated bariatric diet vital signs stable and afebrile. As per dr Jacobson pt can be discharged home today, advised to follow up with Dr Mederos as scheduled and resume home medications asper bariatric. precaution provided.   risks, and benefits explained and all questions answered @ this time

## 2019-01-29 NOTE — DISCHARGE NOTE ADULT - CARE PLAN
Principal Discharge DX:	Obesity  Goal:	recovery  Assessment and plan of treatment:	A/P morbid obesity  s/p surgery

## 2019-01-29 NOTE — PROGRESS NOTE ADULT - ASSESSMENT
Plan:  1.Advance diet according to keila protocol  2.Encourage ambulation   3.DVT and GI prophylaxis   4.Encourage incentive spirometry   5.Pain control

## 2019-01-29 NOTE — DISCHARGE NOTE ADULT - PATIENT PORTAL LINK FT
You can access the [facility] Patient Portal, offered by [facility], by registering with the following website: [email or web address]

## 2019-01-29 NOTE — CHART NOTE - NSCHARTNOTEFT_GEN_A_CORE
37 YO F s/p lap sleeve gastrectomy - POD #1.  Tolerating keila clear liquid diet well at 3 oz/hr.  Has protein shakes and chewable vits/mins at home.  Patient verbalized understanding of phase I diet to begin upon discharge.  DROP sheet reviewed with patient and family.  Will follow up in the office next week.  All questions answered.  Call [phone] with questions/concerns.

## 2019-01-29 NOTE — DISCHARGE NOTE ADULT - HOSPITAL COURSE
This is a 39y/o female presents in [facility] for elective surgery, Pt underwent Laparoscopic sleeve gastrectomy  01/28/2019  post op pt admitted to SICU for close monitoring.  and then downgraded to floor  On 1/29/19 pt without complaints, doing well tolerated bariatric diet and ambulated. per Dr Jacobson pt discharged home  in stable condition. pt advised to follow up with Dr Mederos as scheduled and resume home medication as per bariatric , precaution provided

## 2019-01-29 NOTE — PROGRESS NOTE ADULT - SUBJECTIVE AND OBJECTIVE BOX
NISA KEEN  [id number]  38y Female    Indication for ICU admission:  Admit Date:01-28-19  ICU Date: 1/28  OR Date: 1/28    No Known Allergies    PAST MEDICAL & SURGICAL HISTORY:  Thyroid disease: hashimotos no meds clinically euthyroid 1 yr  GERD (gastroesophageal reflux disease)  Anxiety  HTN (hypertension)  Sleep apnea: on cpap  Diabetes: 1 yr  History of esophagogastroduodenoscopy (EGD): 2018  History of surgery: liposuction  10 yrs ago  History of tonsillectomy: 1983    Home Medications:  metFORMIN: 500 mg po q day (28 Jan 2019 06:08)  vitamin d: once a day (28 Jan 2019 06:08)  Wellbutrin: orally once a day 150 mg po  (28 Jan 2019 06:08)  Xanax 1 mg oral tablet: 1 tab(s) orally once a day, As Needed (28 Jan 2019 06:08)        24HRS EVENT: POD 0 s/p lap sleeve, uncomplicated post operative course  Neurologic:  Pain Control: Morphine PCA    Respiratory:  DANIEL  - CPAP at night (6)  - Maintain O2 sat >94%  - Is/PT    Cardiovascular:  Hx HTN:  - Not on home meds  - Monitor for Tachycardia, maintain normotensive    Gastrointestinal/Nutrition:  - Strict NPO   - PPI    Genitourinary/Renal:  - Passed TOV  - Monitor lytes and replete  - LR@125    Hematologic:  - No acute issues  - Hep SQ  - SCD     Infectious Disease:  - No acute issues    Endocrine:  Hx of DM:  - ISS , FS q6h     Lines: PIVs    Disposition:  SICU      DVT PTX: hsq    GI PTX: ptx    ***Tubes/Lines/Drains  ***  Peripheral IV      Overnight: No acute events      REVIEW OF SYSTEMS    [X] A ten-point review of systems was otherwise negative except as noted.  [ ] Due to altered mental status/intubation, subjective information were not able to be obtained from the patient. History was obtained, to the extent possible, from review of the chart and collateral sources of information. NISA KEEN  [id number]  38y Female    Indication for ICU admission:  Admit Date:01-28-19  ICU Date: 1/28  OR Date: 1/28    No Known Allergies    PAST MEDICAL & SURGICAL HISTORY:  Thyroid disease: hashimotos no meds clinically euthyroid 1 yr  GERD (gastroesophageal reflux disease)  Anxiety  HTN (hypertension)  Sleep apnea: on cpap  Diabetes: 1 yr  History of esophagogastroduodenoscopy (EGD): 2018  History of surgery: liposuction  10 yrs ago  History of tonsillectomy: 1983    Home Medications:  metFORMIN: 500 mg po q day (28 Jan 2019 06:08)  vitamin d: once a day (28 Jan 2019 06:08)  Wellbutrin: orally once a day 150 mg po  (28 Jan 2019 06:08)  Xanax 1 mg oral tablet: 1 tab(s) orally once a day, As Needed (28 Jan 2019 06:08)        24HRS EVENT: POD 0 s/p lap sleeve, uncomplicated post operative course  Neurologic:  Pain Control: Morphine PCA    Respiratory:  DANIEL  - CPAP at night (6)  - Maintain O2 sat >94%  - Is/PT    Cardiovascular:  Hx HTN:  - Not on home meds  - Monitor for Tachycardia, maintain normotensive    Gastrointestinal/Nutrition:  - Strict NPO   - PPI    Genitourinary/Renal:  - Passed TOV  - Monitor lytes and replete  - LR@125    Hematologic:  - No acute issues  - Hep SQ  - SCD     Infectious Disease:  - No acute issues    Endocrine:  Hx of DM:  - ISS , FS q6h     Lines: PIVs    Disposition:  SICU      DVT PTX: hsq    GI PTX: ptx    ***Tubes/Lines/Drains  ***  Peripheral IV      Overnight: No acute events      REVIEW OF SYSTEMS    [X] A ten-point review of systems was otherwise negative except as noted.  [ ] Due to altered mental status/intubation, subjective information were not able to be obtained from the patient. History was obtained, to the extent possible, from review of the chart and collateral sources of information.    Daily     Daily     Diet, Clear Liquid:   Bariatric Clear Liquid (BARICLLIQ) (01-29-19 @ 06:17)      CURRENT MEDS:  Neurologic Medications  ketorolac   Injectable 15 milliGRAM(s) IV Push every 6 hours PRN Severe Pain (7 - 10)  morphine PCA (5 mG/mL) 30 milliLiter(s) PCA Continuous PCA Continuous  ondansetron Injectable 4 milliGRAM(s) IV Push every 6 hours PRN Nausea    Respiratory Medications    Cardiovascular Medications    Gastrointestinal Medications  dextrose 5%. 1000 milliLiter(s) IV Continuous <Continuous>  lactated ringers. 1000 milliLiter(s) IV Continuous <Continuous>  pantoprazole  Injectable 40 milliGRAM(s) IV Push every 24 hours    Genitourinary Medications    Hematologic/Oncologic Medications  heparin  Injectable 5000 Unit(s) SubCutaneous every 8 hours    Antimicrobial/Immunologic Medications    Endocrine/Metabolic Medications  dextrose 40% Gel 15 Gram(s) Oral once PRN Blood Glucose LESS THAN 70 milliGRAM(s)/deciliter  dextrose 50% Injectable 25 Gram(s) IV Push once  glucagon  Injectable 1 milliGRAM(s) IntraMuscular once PRN Glucose LESS THAN 70 milligrams/deciliter  insulin lispro (HumaLOG) corrective regimen sliding scale   SubCutaneous every 6 hours    Topical/Other Medications      ICU Vital Signs Last 24 Hrs  T(C): 36.4 (29 Jan 2019 05:00), Max: 37.1 (28 Jan 2019 17:15)  T(F): 97.6 (29 Jan 2019 05:00), Max: 98.7 (28 Jan 2019 17:15)  HR: 68 (29 Jan 2019 07:00) (68 - 92)  BP: 160/86 (29 Jan 2019 07:00) (114/77 - 183/103)  BP(mean): --  ABP: --  ABP(mean): --  RR: 15 (29 Jan 2019 07:00) (11 - 24)  SpO2: 96% (29 Jan 2019 07:00) (95% - 100%)      Adult Advanced Hemodynamics Last 24 Hrs  CVP(mm Hg): --  CVP(cm H2O): --  CO: --  CI: --  PA: --  PA(mean): --  PCWP: --  SVR: --  SVRI: --  PVR: --  PVRI: --          I&O's Summary    28 Jan 2019 07:01  -  29 Jan 2019 07:00  --------------------------------------------------------  IN: 2645 mL / OUT: 1100 mL / NET: 1545 mL    29 Jan 2019 07:01  -  29 Jan 2019 08:39  --------------------------------------------------------  IN: 155 mL / OUT: 300 mL / NET: -145 mL      I&O's Detail    28 Jan 2019 07:01  -  29 Jan 2019 07:00  --------------------------------------------------------  IN:    lactated ringers.: 895 mL    lactated ringers.: 1750 mL  Total IN: 2645 mL    OUT:    Voided: 1100 mL  Total OUT: 1100 mL    Total NET: 1545 mL      29 Jan 2019 07:01  -  29 Jan 2019 08:39  --------------------------------------------------------  IN:    lactated ringers.: 125 mL    Oral Fluid: 30 mL  Total IN: 155 mL    OUT:    Voided: 300 mL  Total OUT: 300 mL    Total NET: -145 mL          PHYSICAL EXAM:    General/Neuro: GCS 15    Lungs:      clear to auscultation, Normal expansion/effort.     Cardiovascular : S1, S2.  Regular rate and rhythm.    Cardiac Rhythm: Normal Sinus Rhythm    GI: Abdomen soft, distended, incision sites c/d/i    Extremities: Extremities warm, pink, well-perfused.     Derm: Good skin turgor, no skin breakdown.          LABS:  CAPILLARY BLOOD GLUCOSE      POCT Blood Glucose.: 102 mg/dL (29 Jan 2019 05:50)  POCT Blood Glucose.: 129 mg/dL (28 Jan 2019 23:07)  POCT Blood Glucose.: 159 mg/dL (28 Jan 2019 18:17)  POCT Blood Glucose.: 150 mg/dL (28 Jan 2019 12:37)                          13.0   9.10  )-----------( 248      ( 28 Jan 2019 23:30 )             39.3       01-28    138  |  99  |  11  ----------------------------<  132<H>  4.6   |  24  |  0.9    Ca    9.2      28 Jan 2019 23:30  Phos  3.3     01-28  Mg     2.0     01-28        PT/INR - ( 28 Jan 2019 23:30 )   PT: 12.50 sec;   INR: 1.09 ratio         PTT - ( 28 Jan 2019 23:30 )  PTT:30.2 sec NISA KEEN  [id number]  38y Female    Indication for ICU admission:  Admit Date:01-28-19  ICU Date: 1/28  OR Date: 1/28    No Known Allergies    PAST MEDICAL & SURGICAL HISTORY:  Thyroid disease: hashimotos no meds clinically euthyroid 1 yr  GERD (gastroesophageal reflux disease)  Anxiety  HTN (hypertension)  Sleep apnea: on cpap  Diabetes: 1 yr  History of esophagogastroduodenoscopy (EGD): 2018  History of surgery: liposuction  10 yrs ago  History of tonsillectomy: 1983    Home Medications:  metFORMIN: 500 mg po q day (28 Jan 2019 06:08)  vitamin d: once a day (28 Jan 2019 06:08)  Wellbutrin: orally once a day 150 mg po  (28 Jan 2019 06:08)  Xanax 1 mg oral tablet: 1 tab(s) orally once a day, As Needed (28 Jan 2019 06:08)        24HRS EVENT: POD 0 s/p lap sleeve, uncomplicated post operative course  Neurologic:  Pain Control: Morphine PCA    Respiratory:  DANIEL  - CPAP at night (6)  - Maintain O2 sat >94%  - Is/PT    Cardiovascular:  Hx HTN:  - Not on home meds  - Monitor for Tachycardia, maintain normotensive    Gastrointestinal/Nutrition:  - Strict NPO   - PPI    Genitourinary/Renal:  - Passed TOV  - Monitor lytes and replete  - LR@125    Hematologic:  - No acute issues  - Hep SQ  - SCD     Infectious Disease:  - No acute issues    Endocrine:  Hx of DM:  - ISS , FS q6h     Lines: PIVs    Disposition:  SICU      DVT PTX: hsq    GI PTX: ptx    ***Tubes/Lines/Drains  ***  Peripheral IV      Overnight: No acute events      REVIEW OF SYSTEMS    [X] A ten-point review of systems was otherwise negative except as noted.  [ ] Due to altered mental status/intubation, subjective information were not able to be obtained from the patient. History was obtained, to the extent possible, from review of the chart and collateral sources of information.    Daily     Daily     Diet, Clear Liquid:   Bariatric Clear Liquid (BARICLLIQ) (01-29-19 @ 06:17)      CURRENT MEDS:  Neurologic Medications  ketorolac   Injectable 15 milliGRAM(s) IV Push every 6 hours PRN Severe Pain (7 - 10)  morphine PCA (5 mG/mL) 30 milliLiter(s) PCA Continuous PCA Continuous  ondansetron Injectable 4 milliGRAM(s) IV Push every 6 hours PRN Nausea    Respiratory Medications    Cardiovascular Medications    Gastrointestinal Medications  dextrose 5%. 1000 milliLiter(s) IV Continuous <Continuous>  lactated ringers. 1000 milliLiter(s) IV Continuous <Continuous>  pantoprazole  Injectable 40 milliGRAM(s) IV Push every 24 hours    Genitourinary Medications    Hematologic/Oncologic Medications  heparin  Injectable 5000 Unit(s) SubCutaneous every 8 hours    Antimicrobial/Immunologic Medications    Endocrine/Metabolic Medications  dextrose 40% Gel 15 Gram(s) Oral once PRN Blood Glucose LESS THAN 70 milliGRAM(s)/deciliter  dextrose 50% Injectable 25 Gram(s) IV Push once  glucagon  Injectable 1 milliGRAM(s) IntraMuscular once PRN Glucose LESS THAN 70 milligrams/deciliter  insulin lispro (HumaLOG) corrective regimen sliding scale   SubCutaneous every 6 hours    Topical/Other Medications      ICU Vital Signs Last 24 Hrs  T(C): 36.4 (29 Jan 2019 05:00), Max: 37.1 (28 Jan 2019 17:15)  T(F): 97.6 (29 Jan 2019 05:00), Max: 98.7 (28 Jan 2019 17:15)  HR: 68 (29 Jan 2019 07:00) (68 - 92)  BP: 160/86 (29 Jan 2019 07:00) (114/77 - 183/103)  BP(mean): --  ABP: --  ABP(mean): --  RR: 15 (29 Jan 2019 07:00) (11 - 24)  SpO2: 96% (29 Jan 2019 07:00) (95% - 100%)            PHYSICAL EXAM:    General/Neuro: GCS 15    Lungs:      clear to auscultation, Normal expansion/effort.     Cardiovascular : S1, S2.  Regular rate and rhythm.    Cardiac Rhythm: Normal Sinus Rhythm    GI: Abdomen soft, distended, incision sites c/d/i    Extremities: Extremities warm, pink, well-perfused.     Derm: Good skin turgor, no skin breakdown.          LABS:  CAPILLARY BLOOD GLUCOSE      POCT Blood Glucose.: 102 mg/dL (29 Jan 2019 05:50)  POCT Blood Glucose.: 129 mg/dL (28 Jan 2019 23:07)  POCT Blood Glucose.: 159 mg/dL (28 Jan 2019 18:17)  POCT Blood Glucose.: 150 mg/dL (28 Jan 2019 12:37)                          13.0   9.10  )-----------( 248      ( 28 Jan 2019 23:30 )             39.3       01-28    138  |  99  |  11  ----------------------------<  132<H>  4.6   |  24  |  0.9    Ca    9.2      28 Jan 2019 23:30  Phos  3.3     01-28  Mg     2.0     01-28        PT/INR - ( 28 Jan 2019 23:30 )   PT: 12.50 sec;   INR: 1.09 ratio         PTT - ( 28 Jan 2019 23:30 )  PTT:30.2 sec

## 2019-01-30 ENCOUNTER — OTHER (OUTPATIENT)
Age: 39
End: 2019-01-30

## 2019-01-30 RX ORDER — ADHESIVE TAPE 3"X 2.3 YD
50 MCG TAPE, NON-MEDICATED TOPICAL DAILY
Refills: 0 | Status: DISCONTINUED | COMMUNITY
Start: 2018-12-18 | End: 2019-01-30

## 2019-01-31 LAB — SURGICAL PATHOLOGY STUDY: SIGNIFICANT CHANGE UP

## 2019-02-01 DIAGNOSIS — F41.9 ANXIETY DISORDER, UNSPECIFIED: ICD-10-CM

## 2019-02-01 DIAGNOSIS — E66.01 MORBID (SEVERE) OBESITY DUE TO EXCESS CALORIES: ICD-10-CM

## 2019-02-01 DIAGNOSIS — I10 ESSENTIAL (PRIMARY) HYPERTENSION: ICD-10-CM

## 2019-02-01 DIAGNOSIS — Z99.89 DEPENDENCE ON OTHER ENABLING MACHINES AND DEVICES: ICD-10-CM

## 2019-02-01 DIAGNOSIS — G47.33 OBSTRUCTIVE SLEEP APNEA (ADULT) (PEDIATRIC): ICD-10-CM

## 2019-02-01 DIAGNOSIS — K21.9 GASTRO-ESOPHAGEAL REFLUX DISEASE WITHOUT ESOPHAGITIS: ICD-10-CM

## 2019-02-01 DIAGNOSIS — E07.9 DISORDER OF THYROID, UNSPECIFIED: ICD-10-CM

## 2019-02-01 DIAGNOSIS — E11.9 TYPE 2 DIABETES MELLITUS WITHOUT COMPLICATIONS: ICD-10-CM

## 2019-02-07 ENCOUNTER — FORM ENCOUNTER (OUTPATIENT)
Age: 39
End: 2019-02-07

## 2019-02-08 ENCOUNTER — OUTPATIENT (OUTPATIENT)
Dept: OUTPATIENT SERVICES | Facility: HOSPITAL | Age: 39
LOS: 1 days | Discharge: HOME | End: 2019-02-08

## 2019-02-08 ENCOUNTER — APPOINTMENT (OUTPATIENT)
Dept: SURGERY | Facility: CLINIC | Age: 39
End: 2019-02-08
Payer: COMMERCIAL

## 2019-02-08 VITALS — WEIGHT: 217.6 LBS | BODY MASS INDEX: 34.97 KG/M2 | HEIGHT: 66 IN

## 2019-02-08 DIAGNOSIS — Z98.890 OTHER SPECIFIED POSTPROCEDURAL STATES: Chronic | ICD-10-CM

## 2019-02-08 DIAGNOSIS — E66.9 OBESITY, UNSPECIFIED: ICD-10-CM

## 2019-02-08 DIAGNOSIS — Z90.89 ACQUIRED ABSENCE OF OTHER ORGANS: Chronic | ICD-10-CM

## 2019-02-08 PROCEDURE — 99024 POSTOP FOLLOW-UP VISIT: CPT

## 2019-02-08 RX ORDER — ACETAMINOPHEN AND CODEINE 300; 30 MG/1; MG/1
300-30 TABLET ORAL EVERY 6 HOURS
Qty: 8 | Refills: 0 | Status: COMPLETED | COMMUNITY
Start: 2019-01-23 | End: 2019-02-08

## 2019-02-08 NOTE — PHYSICAL EXAM
[de-identified] : Soft, nondistended [de-identified] : Incisions dry and clean with no evidence of erythema

## 2019-02-08 NOTE — DATA REVIEWED
[FreeTextEntry1] : \par Wt. change since last visit: -18.2 lbs\par %EWL: 17\par TWL: 18.2 lbs\par BMI: 35.1\par \par UGI reviewed - no evidence of leak or obstruction.\par

## 2019-02-08 NOTE — HISTORY OF PRESENT ILLNESS
[Procedure: ___] : Procedure performed: [unfilled]  [Date of Surgery: ___] : Date of Surgery:   [unfilled] [Pre-Op Weight ___] : Pre-op weight was [unfilled] lbs [___ Days Post Op] : [unfilled] days  [Phase 2] : Phase 2 [Walking] : walking [de-identified] : Patient had surgery approximately 11 days ago. \par She denies heartburn/reflux/vomiting and food intolerance.\par Her diabetes improved and she has not taken her antihyperglycemic medication since surgery.  She is compliant with nightly CPAP use..\par

## 2019-02-08 NOTE — ASSESSMENT
[___ Days Post Op] : [unfilled] days [Previous Visit Weight: ___] : Previous visit weight: [unfilled] lbs [Today's Weight: ___] : Today's weight:[unfilled] lbs [Today's BMI: ___] : Today's BMI: [unfilled] [de-identified] : NISA KEEN is a 38 year female seen today for Bariatric postoperative visit.  She feels well.\par Patient is compliant with dietary guidelines. She advanced her diet as recommended and tolerated eggs, yogurt and lentils soup.  She is drinking 1 3/4 Pure protein shake and Medifast shake.\par Fluid intake is adequate with mainly water and Crystal Light.\par She is walking daily for 45 minutes.  Reinforced no heavy lifting and pulling for 2 months.\par Plan:  Return to office in 3 weeks.  Call with concerns.\par

## 2019-03-06 ENCOUNTER — APPOINTMENT (OUTPATIENT)
Dept: SURGERY | Facility: CLINIC | Age: 39
End: 2019-03-06
Payer: COMMERCIAL

## 2019-03-06 VITALS — BODY MASS INDEX: 33.73 KG/M2 | HEIGHT: 66 IN | WEIGHT: 209.9 LBS

## 2019-03-06 DIAGNOSIS — E55.9 VITAMIN D DEFICIENCY, UNSPECIFIED: ICD-10-CM

## 2019-03-06 DIAGNOSIS — K21.9 GASTRO-ESOPHAGEAL REFLUX DISEASE W/OUT ESOPHAGITIS: ICD-10-CM

## 2019-03-06 PROCEDURE — 99024 POSTOP FOLLOW-UP VISIT: CPT

## 2019-03-06 RX ORDER — METFORMIN HYDROCHLORIDE 500 MG/1
500 TABLET, COATED ORAL
Refills: 0 | Status: DISCONTINUED | COMMUNITY
Start: 2018-12-18 | End: 2019-03-06

## 2019-03-08 NOTE — ADDENDUM
[FreeTextEntry1] : Patient seen, examined and reviewed with practitioner.  I agree with the assessment and plan.\par

## 2019-03-08 NOTE — HISTORY OF PRESENT ILLNESS
[Procedure: ___] : Procedure performed: [unfilled]  [Date of Surgery: ___] : Date of Surgery:   [unfilled] [Pre-Op Weight ___] : Pre-op weight was [unfilled] lbs [___ Months Post Op] : [unfilled] months [de-identified] : Patient had surgery approximately 1 month ago. \par She denies heartburn/reflux/vomiting and food intolerance.\par Diabetes improved and her back pain resolved.  She is compliant with nightly CPAP use.\par

## 2019-03-08 NOTE — REASON FOR VISIT
[Post Operative Visit] : a post operative visit for [Other___] : [unfilled] [FreeTextEntry2] : Sleeve Gastrectomy on 1/28/19

## 2019-03-08 NOTE — DATA REVIEWED
[FreeTextEntry1] : \par Wt. change since last visit: -7.7 lbs\par TWL: 25.9 lbs\par BMI: 33.8\par \par

## 2019-03-08 NOTE — ASSESSMENT
[FreeTextEntry1] : NISA KEEN is a 38 year female seen today for Bariatric postoperative visit. Patient is doing well.\par Patient is compliant with dietary guidelines.\par Fluid intake is adequate with mainly water and Crystal Light.\par She is exercising 3-4 days/week for 45 minutes.  She will add weight bearing exercises in 1 month.\par \par

## 2019-04-10 ENCOUNTER — RX RENEWAL (OUTPATIENT)
Age: 39
End: 2019-04-10

## 2019-04-12 ENCOUNTER — RX RENEWAL (OUTPATIENT)
Age: 39
End: 2019-04-12

## 2019-04-20 ENCOUNTER — LABORATORY RESULT (OUTPATIENT)
Age: 39
End: 2019-04-20

## 2019-04-20 ENCOUNTER — OUTPATIENT (OUTPATIENT)
Dept: OUTPATIENT SERVICES | Facility: HOSPITAL | Age: 39
LOS: 1 days | Discharge: HOME | End: 2019-04-20

## 2019-04-20 DIAGNOSIS — Z90.89 ACQUIRED ABSENCE OF OTHER ORGANS: Chronic | ICD-10-CM

## 2019-04-20 DIAGNOSIS — Z98.890 OTHER SPECIFIED POSTPROCEDURAL STATES: Chronic | ICD-10-CM

## 2019-04-20 DIAGNOSIS — E55.9 VITAMIN D DEFICIENCY, UNSPECIFIED: ICD-10-CM

## 2019-04-20 DIAGNOSIS — D50.8 OTHER IRON DEFICIENCY ANEMIAS: ICD-10-CM

## 2019-04-20 DIAGNOSIS — D51.1 VITAMIN B12 DEFICIENCY ANEMIA DUE TO SELECTIVE VITAMIN B12 MALABSORPTION WITH PROTEINURIA: ICD-10-CM

## 2019-04-20 DIAGNOSIS — K91.2 POSTSURGICAL MALABSORPTION, NOT ELSEWHERE CLASSIFIED: ICD-10-CM

## 2019-04-20 DIAGNOSIS — Z00.00 ENCOUNTER FOR GENERAL ADULT MEDICAL EXAMINATION WITHOUT ABNORMAL FINDINGS: ICD-10-CM

## 2019-04-20 DIAGNOSIS — E87.8 OTHER DISORDERS OF ELECTROLYTE AND FLUID BALANCE, NOT ELSEWHERE CLASSIFIED: ICD-10-CM

## 2019-04-20 DIAGNOSIS — E78.89 OTHER LIPOPROTEIN METABOLISM DISORDERS: ICD-10-CM

## 2019-04-20 DIAGNOSIS — D50.1 SIDEROPENIC DYSPHAGIA: ICD-10-CM

## 2019-04-20 DIAGNOSIS — E56.9 VITAMIN DEFICIENCY, UNSPECIFIED: ICD-10-CM

## 2019-04-24 ENCOUNTER — APPOINTMENT (OUTPATIENT)
Dept: SURGERY | Facility: CLINIC | Age: 39
End: 2019-04-24
Payer: COMMERCIAL

## 2019-04-24 VITALS — WEIGHT: 196.2 LBS | BODY MASS INDEX: 31.53 KG/M2 | HEIGHT: 66 IN

## 2019-04-24 DIAGNOSIS — G89.29 LOW BACK PAIN: ICD-10-CM

## 2019-04-24 DIAGNOSIS — F41.9 ANXIETY DISORDER, UNSPECIFIED: ICD-10-CM

## 2019-04-24 DIAGNOSIS — F32.9 MAJOR DEPRESSIVE DISORDER, SINGLE EPISODE, UNSPECIFIED: ICD-10-CM

## 2019-04-24 DIAGNOSIS — M54.5 LOW BACK PAIN: ICD-10-CM

## 2019-04-24 DIAGNOSIS — E11.9 TYPE 2 DIABETES MELLITUS W/OUT COMPLICATIONS: ICD-10-CM

## 2019-04-24 DIAGNOSIS — E66.9 OBESITY, UNSPECIFIED: ICD-10-CM

## 2019-04-24 DIAGNOSIS — G47.33 OBSTRUCTIVE SLEEP APNEA (ADULT) (PEDIATRIC): ICD-10-CM

## 2019-04-24 PROCEDURE — 99024 POSTOP FOLLOW-UP VISIT: CPT

## 2019-04-24 NOTE — REASON FOR VISIT
[Post Operative Visit] : a post operative visit for [FreeTextEntry2] : Sleeve Gastrectomy on 1/28/2019

## 2019-04-24 NOTE — PLAN
[FreeTextEntry1] : PLAN:  Continue with diet and exercise.\par             Follow up with pulmonary to have pressure decreased on CPAP. \par             Return to office in 3 months with blood work.\par             Call with concerns.

## 2019-04-24 NOTE — ASSESSMENT
[FreeTextEntry1] : NISA KEEN is a 38 year female seen today for Bariatric postoperative visit.  She is doing well with her weight loss goals.\par Patient is compliant with dietary guidelines.\par He/She eats 3 meals/day.  Breakfast - 1 egg or protein shake.  Lunch - tuna fish or chicken with low fat mayonnaise and low sodium ham with cheese rolled up.  Dinner - grilled chicken, pork chop with steamed vegetables.  She had a teaspoon of mashed potato last week.  Snack - fruits. \par Fluid intake is adequate with mainly water, Vitamin water and 1 cup of coffee.\par She is walking 4 days/week for 60-90 minutes.  Recommend adding weight bearing exercise.\par \par

## 2019-07-24 ENCOUNTER — APPOINTMENT (OUTPATIENT)
Dept: SURGERY | Facility: CLINIC | Age: 39
End: 2019-07-24

## 2019-12-12 ENCOUNTER — OTHER (OUTPATIENT)
Age: 39
End: 2019-12-12

## 2019-12-22 ENCOUNTER — TRANSCRIPTION ENCOUNTER (OUTPATIENT)
Age: 39
End: 2019-12-22

## 2020-01-08 ENCOUNTER — OTHER (OUTPATIENT)
Age: 40
End: 2020-01-08

## 2020-02-16 NOTE — H&P PST ADULT - HEART RATE (BEATS/MIN)
Urinary Tract Infection, Adult  Introduction  A urinary tract infection (UTI) is an infection of any part of the urinary tract. The urinary tract includes the:  · Kidneys.  · Ureters.  · Bladder.  · Urethra.  These organs make, store, and get rid of pee (urine) in the body.  Follow these instructions at home:  · Take over-the-counter and prescription medicines only as told by your doctor.  · If you were prescribed an antibiotic medicine, take it as told by your doctor. Do not stop taking the antibiotic even if you start to feel better.  · Avoid the following drinks:  ¨ Alcohol.  ¨ Caffeine.  ¨ Tea.  ¨ Carbonated drinks.  · Drink enough fluid to keep your pee clear or pale yellow.  · Keep all follow-up visits as told by your doctor. This is important.  · Make sure to:  ¨ Empty your bladder often and completely. Do not to hold pee for long periods of time.  ¨ Empty your bladder before and after sex.  ¨ Wipe from front to back after a bowel movement if you are female. Use each tissue one time when you wipe.  Contact a doctor if:  · You have back pain.  · You have a fever.  · You feel sick to your stomach (nauseous).  · You throw up (vomit).  · Your symptoms do not get better after 3 days.  · Your symptoms go away and then come back.  Get help right away if:  · You have very bad back pain.  · You have very bad lower belly (abdominal) pain.  · You are throwing up and cannot keep down any medicines or water.  This information is not intended to replace advice given to you by your health care provider. Make sure you discuss any questions you have with your health care provider.  Document Released: 06/05/2009 Document Revised: 05/25/2017 Document Reviewed: 11/07/2016  © 2017 [employer]     68

## 2020-05-24 ENCOUNTER — TRANSCRIPTION ENCOUNTER (OUTPATIENT)
Age: 40
End: 2020-05-24

## 2020-10-24 ENCOUNTER — TRANSCRIPTION ENCOUNTER (OUTPATIENT)
Age: 40
End: 2020-10-24

## 2020-12-02 ENCOUNTER — TRANSCRIPTION ENCOUNTER (OUTPATIENT)
Age: 40
End: 2020-12-02

## 2021-02-16 ENCOUNTER — TRANSCRIPTION ENCOUNTER (OUTPATIENT)
Age: 41
End: 2021-02-16

## 2021-05-10 NOTE — ASU PATIENT PROFILE, ADULT - AS SC [facility]
DATE OF PROCEDURE:  06/10/2019

 

SURGEON:  Tio Delgado DMD, MD

 

ASSISTANT:  Kole Patricio NANCY.

 

SPECIMEN:  None

 

INDICATIONS FOR SURGERY:

Zulma 
(4) no limitation
denies pain/discomfort

## 2021-05-11 NOTE — PATIENT PROFILE ADULT - ANY SIGNIFICANT CHANGE IN ABILITY TO PERFORM THE FOLLOWING ADL SINCE THE ONSET OF PRESENT ILLNESS?
[facility] [place]  [facility]  Pulmonary Hypertension Clinic    Pulmonary Hypertension Clinic Visit    Date of Visit:  5/11/2021  Patient Name:  Dee Root  Medical Record Number:  [id number]  YOB: 1945   Primary Physician:  Yashira Terrazas MD  Other Physicians:     This visit is being performed via video to discuss Pulmonary Arterial Hypertension and Follow-up    This visit is being performed virtually.  Clinician Location: [facility]  Dee's Location: Home      CHIEF COMPLAINT: follow-up pulmonary hypertension and HFpEF    HISTORY OF PRESENT ILLNESS:  Dee Root is a 76 year old female who presents to the clinic today for follow-up pulmonary hypertension and HFpEF. Currently, treating with amlodipine 5mg daily, furosemide 40mg daily, and metoprolol 50mg twice daily.    She is accompanied by her daughter.     Ms. Root's daughter reports that her mother has told her that she felt more short of breath at times recently, but her O2 sats have always been in the high 90s.  Ms. Root endorses orthopnea once in a while.  She also reports a little chest pain on the right side while getting dressed yesterday morning.  This resolved on its own.  It does not occur often.  She denies lower extremity edema, abdominal bloating or early satiety.  Her weight was up 7 lbs at her last PCP visit since her previous visit.  Her daughter does not think it is fluid, but rather may be due to change in her diet.  Her blood pressure at home today was 151/77, but her daughter notes that the patient was upset with her at the time.  He blood pressure at home typically in in the 120s or lower.  She denies fevers, chills, sweats, cough or sputum production.       She takes her medications every day, but sometimes does not take her metoprolol.  She has difficulty taking her potassium supplements, so she does not always taken  them either.       Frequency / Description   []  YES    [x]  NO Hospitalization(s):   []  YES    [x]  NO Emergency Room Visit:    PULMONARY ARTERIAL HYPERTENSION MEDICATIONS   [x] NONE [] PDE5-I [] ERA [] soluble guanylate cyclase (sGC) stimulator   [] selective prostacyclin receptor stimulator [] Prostacyclin    MEDICATION SIDE EFFECTS   []  YES    [x]  NO    OTHER MEDICATIONS   [x] Diuretic   [x] BB (beta blocker)  [] ACEi (angiotensin converting enzyme inhibitor)  [] ARB (angiotensin receptor blocker)  [] MRA (mineralocorticoid receptor antagonist)  [] Nitrate  [] Hydralazine [] DIG (digoxin)  [x] CCB (calcium channel blocker)    DEVICES   [x] NONE [] ICD (implantable cardioverter-defibrillator)  [] BIV - ICD (biventricular implantable cardioverter-defibrillator)  [] pacemaker  [] CardioMEMs      COMPLIANCE   Description   [x]  YES    []  NO Medication(s):   [x]  YES    []  NO Diet:    Allergies, Past Medical, Family and Social History reviewed in EPIC.     Outpatient Medications Marked as Taking for the 5/11/21 encounter (Office Visit) with Jaime Hernandez MD PHD   Medication Sig Dispense Refill   • metoPROLOL tartrate (LOPRESSOR) 25 MG tablet Take 1 tablet by mouth every 12 hours. 60 tablet 3   • amLODIPine (NORVASC) 5 MG tablet Take 1 tablet by mouth daily. 30 tablet 3   • potassium chloride (KLOR-CON M) 10 MEQ felipe ER tablet Take 2 tablets by mouth daily. 60 tablet 3   • furosemide (Lasix) 40 MG tablet Take 1 tablet by mouth daily. 30 tablet 3   • acetaminophen (TYLENOL) 325 MG tablet Take 2 tablets by mouth every 4 hours as needed for Pain or Fever. Dose: 2 tabs (=650mg) 100 tablet 0   • neomycin-bacitracin-polymyxin-pramoxine (Triple Antibiotic Plus) 1 % ointment Apply to affected area topically as needed for minor skin abrasions up to 3 times a day as needed. 30 g 0   • Cholecalciferol (vitamin D3) 10 mcg (400 units) tablet 400 mcg take 2 tablets daily. 60 tablet 0   • esomeprazole (NEXIUM) 40 MG capsule  Take 1 capsule by mouth daily (before breakfast). 90 capsule 3   • magnesium hydroxide (MILK OF MAGNESIA) 400 MG/5ML suspension 30ml once daily as needed for constipation (only use if no BM X3 days) 1 Bottle 4       REVIEW OF SYSTEMS:  10 point review of systems was completed and is negative except as stated above in HPI (history of present illness).    There were no vitals taken for this visit.  There is no height or weight on file to calculate BMI.    PHYSICAL EXAMINATION:  Unable to examine patient due to virtual visit    LABORATORY:  Recent Labs   Lab 04/08/21  1451 10/14/20  1327 10/14/20  1129 09/26/20  1617   Sodium 138 138  --  137   Potassium 4.8 4.3  --  4.4   Chloride 105 107  --  103   Carbon Dioxide 33* 28  --  27   BUN 18 15  --  17   Calcium 10.9* 10.4*  --  10.8*   Glucose 81 81  --  84   Creatinine 1.01* 0.88 0.90 1.02*       Recent Labs   Lab 04/08/21  1451   Cholesterol 200*   HDL 59   Cholesterol/ HDL Ratio 3.4       Recent Labs   Lab 10/14/20  1124 09/26/20  1617 09/22/20  0552 07/02/20  1836   WBC 5.8 6.9 6.5 7.1   RBC 6.06* 5.36* 5.01 5.53*   HGB 14.2 12.7 11.8* 13.2   HCT 47.8* 42.9 39.6 41.6    317 305 255   Prothrombin Time  --   --   --  11.6   INR  --   --   --  1.1       Lab Results   Component Value Date    NTPROB 327 09/26/2020    NTPROB 2,713 (H) 09/20/2020    NTPROB 1,242 (H) 07/02/2020       Lab Results   Component Value Date    RESR 27 05/13/2005    RESR 25 04/09/2002    ANABL 1.8 (H) 05/13/2005    RA 37 (H) 05/13/2005         DIAGNOSTICS:  The following diagnostics were reviewed:    Cardiac:  Echo 5/6/2021:  Mild RV enlargement with mildly decreased RV systolic function.  Tricuspid annular dilatation. Suboptimal coaptation of the TV leaflets. Moderate-severe TR.  PASP 69 mmHg.    7/3/2020 ECHO:  Left ventricular ejection fraction, 65 %.  Grade I/IV diastolic dysfunction (abnormal relaxation filling pattern), normal to mildly elevated filling pressures.  LV Global  longitudinal strain 21%.  Increased right atrial size.  Eccentric posteriorly directed mild mitral regurgitation jet.  Moderate tricuspid valve regurgitation.  Severe pulmonary hypertension, RVSP 79.5 mmHg.  As compared to previous echocardiogram dated 02/03/2020 tricuspid regurgitation is a little less severe and RVSP is lower at 79 mmHg, still indicating severe pulmonary hypertension (RVSP 111 mmHg in previous study).    ASSESSMENT:  1. Chronic heart failure with preserved ejection fraction   2. Pulmonary hypertension due to HFpEF (WHO group II PH)  3. Diastolic dysfunction   4. Hypertension: on ARB, hydralazine, BB  5. CAD  6. Hyperlipidemia  7. History of PE - 4/2016 (resolved on 3 month follow-up CTA of chest)  8. Dementia   9. History of tobacco abuse     PLAN:  1. Briefly reviewed most recent labs and echocardiogram results with the daughter.   2. Continue present medications  3. Weights daily. To call with 3 lb weight gain overnight or 5 lb weight gain in one week.  Patient's daughter is considering placing a grab bar to steady patient so she is able to get on the scale.  4. Limit sodium to 2000 mg daily and fluids to 64 ounces (2000 mL) daily. The daughter says she will do her best to help her mother comply with this.   5. Avoid the use of NSAIDs, including but not limited to Ibuprofen, Advil and Aleve. Encouraged to check with community pharmacist with all over-the-counter medications to ensure product does not contain NSAIDs.   6. Follow-up 2 months in clinic with labs (BMP, NT-pro BNP).      Greater than 50% of this 30 minute visit was spent in education, counseling and coordination of care.  Patient and daughter understand they can return sooner if any issues should arise.    Jaime Hernandez MD, PhD  Cardiology - Heart Failure & Transplant, Pulmonary Hypertension  [facility] [place] [facility] [place]        no

## 2021-12-06 ENCOUNTER — TRANSCRIPTION ENCOUNTER (OUTPATIENT)
Age: 41
End: 2021-12-06

## 2023-03-23 RX ORDER — ALPRAZOLAM 0.25 MG
1 TABLET ORAL
Qty: 0 | Refills: 0 | DISCHARGE

## 2023-03-23 RX ORDER — METFORMIN HYDROCHLORIDE 850 MG/1
0 TABLET ORAL
Qty: 0 | Refills: 0 | DISCHARGE

## 2023-03-23 RX ORDER — BUPROPION HYDROCHLORIDE 150 MG/1
0 TABLET, EXTENDED RELEASE ORAL
Qty: 0 | Refills: 0 | DISCHARGE

## 2023-03-24 ENCOUNTER — TRANSCRIPTION ENCOUNTER (OUTPATIENT)
Age: 43
End: 2023-03-24

## 2023-04-13 PROBLEM — E07.9 DISORDER OF THYROID, UNSPECIFIED: Chronic | Status: ACTIVE | Noted: 2019-01-21

## 2023-04-13 PROBLEM — I10 ESSENTIAL (PRIMARY) HYPERTENSION: Chronic | Status: ACTIVE | Noted: 2019-01-21

## 2023-04-13 PROBLEM — K21.9 GASTRO-ESOPHAGEAL REFLUX DISEASE WITHOUT ESOPHAGITIS: Chronic | Status: ACTIVE | Noted: 2019-01-21

## 2023-04-13 PROBLEM — G47.30 SLEEP APNEA, UNSPECIFIED: Chronic | Status: ACTIVE | Noted: 2019-01-21

## 2023-04-13 PROBLEM — F41.9 ANXIETY DISORDER, UNSPECIFIED: Chronic | Status: ACTIVE | Noted: 2019-01-21

## 2023-04-13 PROBLEM — E11.9 TYPE 2 DIABETES MELLITUS WITHOUT COMPLICATIONS: Chronic | Status: ACTIVE | Noted: 2019-01-21

## 2023-04-20 ENCOUNTER — OUTPATIENT (OUTPATIENT)
Dept: OUTPATIENT SERVICES | Facility: HOSPITAL | Age: 43
LOS: 1 days | End: 2023-04-20
Payer: MEDICARE

## 2023-04-20 ENCOUNTER — LABORATORY RESULT (OUTPATIENT)
Age: 43
End: 2023-04-20

## 2023-04-20 ENCOUNTER — APPOINTMENT (OUTPATIENT)
Dept: HEMATOLOGY ONCOLOGY | Facility: CLINIC | Age: 43
End: 2023-04-20
Payer: MEDICARE

## 2023-04-20 VITALS
SYSTOLIC BLOOD PRESSURE: 167 MMHG | HEIGHT: 67 IN | HEART RATE: 94 BPM | DIASTOLIC BLOOD PRESSURE: 76 MMHG | RESPIRATION RATE: 16 BRPM | BODY MASS INDEX: 32.02 KG/M2 | WEIGHT: 204 LBS | TEMPERATURE: 98.1 F

## 2023-04-20 DIAGNOSIS — Z90.89 ACQUIRED ABSENCE OF OTHER ORGANS: Chronic | ICD-10-CM

## 2023-04-20 DIAGNOSIS — Z86.2 PERSONAL HISTORY OF DISEASES OF THE BLOOD AND BLOOD-FORMING ORGANS AND CERTAIN DISORDERS INVOLVING THE IMMUNE MECHANISM: ICD-10-CM

## 2023-04-20 DIAGNOSIS — D50.8 OTHER IRON DEFICIENCY ANEMIAS: ICD-10-CM

## 2023-04-20 DIAGNOSIS — Z98.890 OTHER SPECIFIED POSTPROCEDURAL STATES: Chronic | ICD-10-CM

## 2023-04-20 DIAGNOSIS — D64.9 ANEMIA, UNSPECIFIED: ICD-10-CM

## 2023-04-20 DIAGNOSIS — Z87.891 PERSONAL HISTORY OF NICOTINE DEPENDENCE: ICD-10-CM

## 2023-04-20 DIAGNOSIS — Z78.9 OTHER SPECIFIED HEALTH STATUS: ICD-10-CM

## 2023-04-20 DIAGNOSIS — Z86.39 PERSONAL HISTORY OF OTHER ENDOCRINE, NUTRITIONAL AND METABOLIC DISEASE: ICD-10-CM

## 2023-04-20 LAB
HCT VFR BLD CALC: 27.6 %
HGB BLD-MCNC: 7.2 G/DL
IRON SATN MFR SERPL: 4 %
IRON SERPL-MCNC: 21 UG/DL
MCHC RBC-ENTMCNC: 15.7 PG
MCHC RBC-ENTMCNC: 26.1 G/DL
MCV RBC AUTO: 60.3 FL
PLATELET # BLD AUTO: 288 K/UL
PMV BLD: 9.1 FL
RBC # BLD: 4.58 M/UL
RBC # FLD: 21.2 %
TIBC SERPL-MCNC: 472 UG/DL
UIBC SERPL-MCNC: 451 UG/DL
WBC # FLD AUTO: 6.79 K/UL

## 2023-04-20 PROCEDURE — 85027 COMPLETE CBC AUTOMATED: CPT

## 2023-04-20 PROCEDURE — 82728 ASSAY OF FERRITIN: CPT

## 2023-04-20 PROCEDURE — 83550 IRON BINDING TEST: CPT

## 2023-04-20 PROCEDURE — 83540 ASSAY OF IRON: CPT

## 2023-04-20 PROCEDURE — 82746 ASSAY OF FOLIC ACID SERUM: CPT

## 2023-04-20 PROCEDURE — 82607 VITAMIN B-12: CPT

## 2023-04-20 PROCEDURE — 99203 OFFICE O/P NEW LOW 30 MIN: CPT

## 2023-04-20 PROCEDURE — 83921 ORGANIC ACID SINGLE QUANT: CPT

## 2023-04-21 ENCOUNTER — LABORATORY RESULT (OUTPATIENT)
Age: 43
End: 2023-04-21

## 2023-04-21 ENCOUNTER — OUTPATIENT (OUTPATIENT)
Dept: OUTPATIENT SERVICES | Facility: HOSPITAL | Age: 43
LOS: 1 days | End: 2023-04-21
Payer: MEDICARE

## 2023-04-21 ENCOUNTER — APPOINTMENT (OUTPATIENT)
Dept: INFUSION THERAPY | Facility: CLINIC | Age: 43
End: 2023-04-21

## 2023-04-21 DIAGNOSIS — D64.9 ANEMIA, UNSPECIFIED: ICD-10-CM

## 2023-04-21 DIAGNOSIS — Z98.890 OTHER SPECIFIED POSTPROCEDURAL STATES: Chronic | ICD-10-CM

## 2023-04-21 DIAGNOSIS — Z00.00 ENCOUNTER FOR GENERAL ADULT MEDICAL EXAMINATION W/OUT ABNORMAL FINDINGS: ICD-10-CM

## 2023-04-21 DIAGNOSIS — Z90.89 ACQUIRED ABSENCE OF OTHER ORGANS: Chronic | ICD-10-CM

## 2023-04-21 PROBLEM — Z78.9 SOCIAL ALCOHOL USE: Status: ACTIVE | Noted: 2018-03-23

## 2023-04-21 PROBLEM — Z87.891 FORMER SMOKER: Status: ACTIVE | Noted: 2018-03-23

## 2023-04-21 PROBLEM — Z86.39 HISTORY OF THYROID NODULE: Status: RESOLVED | Noted: 2019-01-23 | Resolved: 2023-04-21

## 2023-04-21 PROBLEM — Z86.2 HISTORY OF ANEMIA: Status: RESOLVED | Noted: 2023-04-21 | Resolved: 2023-04-21

## 2023-04-21 PROCEDURE — 85027 COMPLETE CBC AUTOMATED: CPT

## 2023-04-21 PROCEDURE — 96365 THER/PROPH/DIAG IV INF INIT: CPT

## 2023-04-21 RX ORDER — FERUMOXYTOL 510 MG/17ML
510 INJECTION INTRAVENOUS ONCE
Refills: 0 | Status: COMPLETED | OUTPATIENT
Start: 2023-04-21 | End: 2023-04-21

## 2023-04-21 RX ADMIN — FERUMOXYTOL 510 MILLIGRAM(S): 510 INJECTION INTRAVENOUS at 13:30

## 2023-04-21 RX ADMIN — FERUMOXYTOL 468 MILLIGRAM(S): 510 INJECTION INTRAVENOUS at 12:56

## 2023-04-24 LAB
FERRITIN SERPL-MCNC: 3 NG/ML
FOLATE SERPL-MCNC: 6.1 NG/ML
HCT VFR BLD CALC: 27.9 %
HGB BLD-MCNC: 7.2 G/DL
MCHC RBC-ENTMCNC: 15.6 PG
MCHC RBC-ENTMCNC: 25.8 G/DL
MCV RBC AUTO: 60.3 FL
PLATELET # BLD AUTO: 234 K/UL
PMV BLD: 9.6 FL
RBC # BLD: 4.63 M/UL
RBC # FLD: 21.2 %
VIT B12 SERPL-MCNC: 488 PG/ML
WBC # FLD AUTO: 7.14 K/UL

## 2023-04-26 DIAGNOSIS — D50.8 OTHER IRON DEFICIENCY ANEMIAS: ICD-10-CM

## 2023-04-26 DIAGNOSIS — Z86.2 PERSONAL HISTORY OF DISEASES OF THE BLOOD AND BLOOD-FORMING ORGANS AND CERTAIN DISORDERS INVOLVING THE IMMUNE MECHANISM: ICD-10-CM

## 2023-04-26 DIAGNOSIS — Z87.891 PERSONAL HISTORY OF NICOTINE DEPENDENCE: ICD-10-CM

## 2023-04-26 DIAGNOSIS — Z78.9 OTHER SPECIFIED HEALTH STATUS: ICD-10-CM

## 2023-04-26 DIAGNOSIS — Z86.39 PERSONAL HISTORY OF OTHER ENDOCRINE, NUTRITIONAL AND METABOLIC DISEASE: ICD-10-CM

## 2023-04-26 DIAGNOSIS — D50.9 IRON DEFICIENCY ANEMIA, UNSPECIFIED: ICD-10-CM

## 2023-04-26 LAB — METHYLMALONATE SERPL-SCNC: 128 NMOL/L

## 2023-04-26 NOTE — HISTORY OF PRESENT ILLNESS
[de-identified] : Patient is a 42 year old female referred to hematology clinic by PCP due to low ferritin and hemoglobin levels. \par Patient had a sleeve gastrectomy on 1/28/2019. Patient has a history of thalassemia trait but has had normal blood levels up until her bariatric surgery in 2019. Patient also states she does not eat a lot of iron-containing foods. Almost not tolerating Protein.\par Pt complains of fatigue and weakness that has been progressing for months. \par Pt is up to date on mammography but states she has never had a PAP smear or colonoscopy. \par Lab results obtained on 4/20/23 after the visit show a HGB of 7.2 and a MCV of 60.3. Patient was informed during visit that she will need IV iron infusions.

## 2023-04-26 NOTE — ASSESSMENT
[FreeTextEntry1] :  42 year old female referred to hematology clinic by PCP due to low ferritin and hemoglobin levels. \par  \par Plan:\par Blood work from 4/20/23 ordered with CBC , ferritin level , Serum Iron.\par Folate level.\par Patient advised to follow up with PCP for regular blood work.\par She will follow up with GYN,\par Patient aware of HGB is 7.2/27.9, she declined blood transfusions today.\par Schedule to start on Feraheme weekly x2\par She will start on folate tablet. \par  Patient advised to follow up with GI.\par She will follow up with \par \par \par \par

## 2023-04-26 NOTE — REASON FOR VISIT
[Initial Consultation] : an initial consultation for [Parent] : parent [FreeTextEntry2] : Patient referred to clinic from PCP regarding low ferritin and hemoglobin levels.

## 2023-04-26 NOTE — HISTORY OF PRESENT ILLNESS
[de-identified] : Patient is a 42 year old female referred to hematology clinic by PCP due to low ferritin and hemoglobin levels. \par Patient had a sleeve gastrectomy on 1/28/2019. Patient has a history of thalassemia trait but has had normal blood levels up until her bariatric surgery in 2019. Patient also states she does not eat a lot of iron-containing foods. Almost not tolerating Protein.\par Pt complains of fatigue and weakness that has been progressing for months. \par Pt is up to date on mammography but states she has never had a PAP smear or colonoscopy. \par Lab results obtained on 4/20/23 after the visit show a HGB of 7.2 and a MCV of 60.3. Patient was informed during visit that she will need IV iron infusions.

## 2023-04-27 ENCOUNTER — LABORATORY RESULT (OUTPATIENT)
Age: 43
End: 2023-04-27

## 2023-04-27 ENCOUNTER — APPOINTMENT (OUTPATIENT)
Dept: INFUSION THERAPY | Facility: CLINIC | Age: 43
End: 2023-04-27

## 2023-04-27 ENCOUNTER — OUTPATIENT (OUTPATIENT)
Dept: OUTPATIENT SERVICES | Facility: HOSPITAL | Age: 43
LOS: 1 days | End: 2023-04-27
Payer: MEDICARE

## 2023-04-27 DIAGNOSIS — D50.8 OTHER IRON DEFICIENCY ANEMIAS: ICD-10-CM

## 2023-04-27 DIAGNOSIS — Z98.890 OTHER SPECIFIED POSTPROCEDURAL STATES: Chronic | ICD-10-CM

## 2023-04-27 DIAGNOSIS — D64.9 ANEMIA, UNSPECIFIED: ICD-10-CM

## 2023-04-27 DIAGNOSIS — Z78.9 OTHER SPECIFIED HEALTH STATUS: ICD-10-CM

## 2023-04-27 DIAGNOSIS — Z87.891 PERSONAL HISTORY OF NICOTINE DEPENDENCE: ICD-10-CM

## 2023-04-27 DIAGNOSIS — Z86.39 PERSONAL HISTORY OF OTHER ENDOCRINE, NUTRITIONAL AND METABOLIC DISEASE: ICD-10-CM

## 2023-04-27 DIAGNOSIS — Z86.2 PERSONAL HISTORY OF DISEASES OF THE BLOOD AND BLOOD-FORMING ORGANS AND CERTAIN DISORDERS INVOLVING THE IMMUNE MECHANISM: ICD-10-CM

## 2023-04-27 DIAGNOSIS — D50.9 IRON DEFICIENCY ANEMIA, UNSPECIFIED: ICD-10-CM

## 2023-04-27 DIAGNOSIS — Z90.89 ACQUIRED ABSENCE OF OTHER ORGANS: Chronic | ICD-10-CM

## 2023-04-27 PROCEDURE — 96365 THER/PROPH/DIAG IV INF INIT: CPT

## 2023-04-27 PROCEDURE — 85027 COMPLETE CBC AUTOMATED: CPT

## 2023-04-27 RX ORDER — FERUMOXYTOL 510 MG/17ML
510 INJECTION INTRAVENOUS ONCE
Refills: 0 | Status: COMPLETED | OUTPATIENT
Start: 2023-04-27 | End: 2023-04-27

## 2023-04-27 RX ADMIN — FERUMOXYTOL 234 MILLIGRAM(S): 510 INJECTION INTRAVENOUS at 10:43

## 2023-04-27 RX ADMIN — FERUMOXYTOL 510 MILLIGRAM(S): 510 INJECTION INTRAVENOUS at 11:20

## 2023-05-18 LAB
HCT VFR BLD CALC: 32.1 %
HGB BLD-MCNC: 8.4 G/DL
MCHC RBC-ENTMCNC: 17.3 PG
MCHC RBC-ENTMCNC: 26.2 G/DL
MCV RBC AUTO: 66 FL
PLATELET # BLD AUTO: 336 K/UL
PMV BLD: 9.4 FL
RBC # BLD: 4.86 M/UL
RBC # FLD: 29.7 %
WBC # FLD AUTO: 7.56 K/UL

## 2023-05-22 RX ORDER — BUPROPION HYDROCHLORIDE 200 MG/1
200 TABLET, FILM COATED, EXTENDED RELEASE ORAL DAILY
Refills: 0 | Status: DISCONTINUED | COMMUNITY
Start: 2018-12-18 | End: 2023-05-22

## 2023-05-22 RX ORDER — CALCIUM CARBONATE/VITAMIN D3 500 MG-2.5
TABLET,CHEWABLE ORAL DAILY
Refills: 0 | Status: DISCONTINUED | COMMUNITY
End: 2023-05-22

## 2023-05-22 RX ORDER — ERGOCALCIFEROL 1.25 MG/1
1.25 MG CAPSULE, LIQUID FILLED ORAL
Qty: 4 | Refills: 2 | Status: DISCONTINUED | COMMUNITY
Start: 2018-12-21 | End: 2023-05-22

## 2023-05-22 RX ORDER — URSODIOL 300 MG/1
300 CAPSULE ORAL
Qty: 60 | Refills: 5 | Status: DISCONTINUED | COMMUNITY
Start: 2019-03-06 | End: 2023-05-22

## 2023-05-22 RX ORDER — PEDIATRIC MULTIVITAMIN NO.17
TABLET,CHEWABLE ORAL DAILY
Refills: 0 | Status: DISCONTINUED | COMMUNITY
End: 2023-05-22

## 2023-05-22 RX ORDER — OMEPRAZOLE 40 MG/1
40 CAPSULE, DELAYED RELEASE ORAL
Qty: 30 | Refills: 2 | Status: DISCONTINUED | COMMUNITY
Start: 2019-01-23 | End: 2023-05-22

## 2023-05-22 NOTE — HISTORY OF PRESENT ILLNESS
[Home] : at home, [unfilled] , at the time of the visit. [Medical Office: ([facility])___] : at the medical office located in  [Verbal consent obtained from patient] : the patient, [unfilled] [FreeTextEntry4] : Tete Bueno

## 2023-05-24 ENCOUNTER — LABORATORY RESULT (OUTPATIENT)
Age: 43
End: 2023-05-24

## 2023-05-24 ENCOUNTER — OUTPATIENT (OUTPATIENT)
Dept: OUTPATIENT SERVICES | Facility: HOSPITAL | Age: 43
LOS: 1 days | End: 2023-05-24
Payer: MEDICARE

## 2023-05-24 ENCOUNTER — APPOINTMENT (OUTPATIENT)
Dept: HEMATOLOGY ONCOLOGY | Facility: CLINIC | Age: 43
End: 2023-05-24

## 2023-05-24 DIAGNOSIS — Z86.39 PERSONAL HISTORY OF OTHER ENDOCRINE, NUTRITIONAL AND METABOLIC DISEASE: ICD-10-CM

## 2023-05-24 DIAGNOSIS — D50.8 OTHER IRON DEFICIENCY ANEMIAS: ICD-10-CM

## 2023-05-24 DIAGNOSIS — Z98.890 OTHER SPECIFIED POSTPROCEDURAL STATES: Chronic | ICD-10-CM

## 2023-05-24 DIAGNOSIS — Z86.2 PERSONAL HISTORY OF DISEASES OF THE BLOOD AND BLOOD-FORMING ORGANS AND CERTAIN DISORDERS INVOLVING THE IMMUNE MECHANISM: ICD-10-CM

## 2023-05-24 DIAGNOSIS — D64.9 ANEMIA, UNSPECIFIED: ICD-10-CM

## 2023-05-24 DIAGNOSIS — Z87.891 PERSONAL HISTORY OF NICOTINE DEPENDENCE: ICD-10-CM

## 2023-05-24 DIAGNOSIS — Z90.89 ACQUIRED ABSENCE OF OTHER ORGANS: Chronic | ICD-10-CM

## 2023-05-24 DIAGNOSIS — D50.9 IRON DEFICIENCY ANEMIA, UNSPECIFIED: ICD-10-CM

## 2023-05-24 DIAGNOSIS — Z78.9 OTHER SPECIFIED HEALTH STATUS: ICD-10-CM

## 2023-05-24 LAB
HCT VFR BLD CALC: 42.1 %
HGB BLD-MCNC: 12.6 G/DL
MCHC RBC-ENTMCNC: 22.8 PG
MCHC RBC-ENTMCNC: 29.9 G/DL
MCV RBC AUTO: 76.3 FL
PLATELET # BLD AUTO: 217 K/UL
PMV BLD: NORMAL
RBC # BLD: 5.52 M/UL
RBC # FLD: NORMAL %
WBC # FLD AUTO: 7.86 K/UL

## 2023-05-24 PROCEDURE — 85027 COMPLETE CBC AUTOMATED: CPT

## 2023-05-25 ENCOUNTER — APPOINTMENT (OUTPATIENT)
Dept: GASTROENTEROLOGY | Facility: CLINIC | Age: 43
End: 2023-05-25

## 2023-06-07 ENCOUNTER — OUTPATIENT (OUTPATIENT)
Dept: OUTPATIENT SERVICES | Facility: HOSPITAL | Age: 43
LOS: 1 days | End: 2023-06-07
Payer: MEDICARE

## 2023-06-07 ENCOUNTER — APPOINTMENT (OUTPATIENT)
Dept: HEMATOLOGY ONCOLOGY | Facility: CLINIC | Age: 43
End: 2023-06-07

## 2023-06-07 ENCOUNTER — LABORATORY RESULT (OUTPATIENT)
Age: 43
End: 2023-06-07

## 2023-06-07 DIAGNOSIS — Z78.9 OTHER SPECIFIED HEALTH STATUS: ICD-10-CM

## 2023-06-07 DIAGNOSIS — Z86.39 PERSONAL HISTORY OF OTHER ENDOCRINE, NUTRITIONAL AND METABOLIC DISEASE: ICD-10-CM

## 2023-06-07 DIAGNOSIS — Z86.2 PERSONAL HISTORY OF DISEASES OF THE BLOOD AND BLOOD-FORMING ORGANS AND CERTAIN DISORDERS INVOLVING THE IMMUNE MECHANISM: ICD-10-CM

## 2023-06-07 DIAGNOSIS — Z98.890 OTHER SPECIFIED POSTPROCEDURAL STATES: Chronic | ICD-10-CM

## 2023-06-07 DIAGNOSIS — D64.9 ANEMIA, UNSPECIFIED: ICD-10-CM

## 2023-06-07 DIAGNOSIS — Z87.891 PERSONAL HISTORY OF NICOTINE DEPENDENCE: ICD-10-CM

## 2023-06-07 DIAGNOSIS — D50.9 IRON DEFICIENCY ANEMIA, UNSPECIFIED: ICD-10-CM

## 2023-06-07 DIAGNOSIS — Z90.89 ACQUIRED ABSENCE OF OTHER ORGANS: Chronic | ICD-10-CM

## 2023-06-07 DIAGNOSIS — D50.8 OTHER IRON DEFICIENCY ANEMIAS: ICD-10-CM

## 2023-06-07 LAB
HCT VFR BLD CALC: 41.4 %
HGB BLD-MCNC: 12.6 G/DL
IRON SATN MFR SERPL: 10 %
IRON SERPL-MCNC: 36 UG/DL
MCHC RBC-ENTMCNC: 23.9 PG
MCHC RBC-ENTMCNC: 30.4 G/DL
MCV RBC AUTO: 78.4 FL
PLATELET # BLD AUTO: 228 K/UL
PMV BLD: 9.5 FL
RBC # BLD: 5.28 M/UL
RBC # FLD: NORMAL %
TIBC SERPL-MCNC: 362 UG/DL
UIBC SERPL-MCNC: 326 UG/DL
WBC # FLD AUTO: 6.72 K/UL

## 2023-06-07 PROCEDURE — 83550 IRON BINDING TEST: CPT

## 2023-06-07 PROCEDURE — 82728 ASSAY OF FERRITIN: CPT

## 2023-06-07 PROCEDURE — 85027 COMPLETE CBC AUTOMATED: CPT

## 2023-06-07 PROCEDURE — 83540 ASSAY OF IRON: CPT

## 2023-06-08 ENCOUNTER — APPOINTMENT (OUTPATIENT)
Dept: HEMATOLOGY ONCOLOGY | Facility: CLINIC | Age: 43
End: 2023-06-08
Payer: MEDICARE

## 2023-06-08 ENCOUNTER — OUTPATIENT (OUTPATIENT)
Dept: OUTPATIENT SERVICES | Facility: HOSPITAL | Age: 43
LOS: 1 days | End: 2023-06-08
Payer: MEDICARE

## 2023-06-08 VITALS
DIASTOLIC BLOOD PRESSURE: 71 MMHG | HEIGHT: 67 IN | RESPIRATION RATE: 18 BRPM | TEMPERATURE: 98 F | WEIGHT: 204 LBS | OXYGEN SATURATION: 99 % | SYSTOLIC BLOOD PRESSURE: 153 MMHG | HEART RATE: 81 BPM | BODY MASS INDEX: 32.02 KG/M2

## 2023-06-08 DIAGNOSIS — D50.9 IRON DEFICIENCY ANEMIA, UNSPECIFIED: ICD-10-CM

## 2023-06-08 DIAGNOSIS — Z86.39 PERSONAL HISTORY OF OTHER ENDOCRINE, NUTRITIONAL AND METABOLIC DISEASE: ICD-10-CM

## 2023-06-08 DIAGNOSIS — Z98.890 OTHER SPECIFIED POSTPROCEDURAL STATES: Chronic | ICD-10-CM

## 2023-06-08 DIAGNOSIS — D64.9 ANEMIA, UNSPECIFIED: ICD-10-CM

## 2023-06-08 DIAGNOSIS — Z90.89 ACQUIRED ABSENCE OF OTHER ORGANS: Chronic | ICD-10-CM

## 2023-06-08 DIAGNOSIS — Z87.891 PERSONAL HISTORY OF NICOTINE DEPENDENCE: ICD-10-CM

## 2023-06-08 DIAGNOSIS — Z86.2 PERSONAL HISTORY OF DISEASES OF THE BLOOD AND BLOOD-FORMING ORGANS AND CERTAIN DISORDERS INVOLVING THE IMMUNE MECHANISM: ICD-10-CM

## 2023-06-08 DIAGNOSIS — D50.8 OTHER IRON DEFICIENCY ANEMIAS: ICD-10-CM

## 2023-06-08 DIAGNOSIS — Z78.9 OTHER SPECIFIED HEALTH STATUS: ICD-10-CM

## 2023-06-08 LAB — FERRITIN SERPL-MCNC: 12 NG/ML

## 2023-06-08 PROCEDURE — 99213 OFFICE O/P EST LOW 20 MIN: CPT

## 2023-06-10 NOTE — HISTORY OF PRESENT ILLNESS
[de-identified] : Patient is a 42 year old female referred to hematology clinic by PCP due to low ferritin and hemoglobin levels. \par Patient had a sleeve gastrectomy on 1/28/2019. Patient has a history of thalassemia trait but has had normal blood levels up until her bariatric surgery in 2019. Patient also states she does not eat a lot of iron-containing foods. Almost not tolerating Protein.\par Pt complains of fatigue and weakness that has been progressing for months. \par Pt is up to date on mammography but states she has never had a PAP smear or colonoscopy. \par Lab results obtained on 4/20/23 after the visit show a HGB of 7.2 and a MCV of 60.3. Patient was informed during visit that she will need IV iron infusions.  [de-identified] : 6/8/2023: Patient returns for follow up for iron deficiency anemia (likely secondary to malabsorption from bariatric surgery). She had two doses of Feraheme in April 2023. She states that she feels better after receiving the infusions, however she still does not feel "back to her normal self". As per patient, she never had issues with anemia prior to her bariatric surgery (which was done in 2019).

## 2023-06-10 NOTE — ASSESSMENT
[FreeTextEntry1] : #Iron deficiency anemia secondary to malabsorption \par -History of bariatric surgery in 2019\par -intolerant to PO iron\par -S/P Feraheme in April 2023\par \par PLAN: \par -CBC from yesterday reviewed, Hgb improved to 12.6, MCV 78.4, TIBC decreased to 362. Ferritin 12\par -Will proceed with Feraheme x 1 \par -Follow up with GI (Dr. Murillo) next week\par \par Repeat CBC, ferritin in 3 months\par \par Patient was seen and examined and discussed with Dr. Neal who agrees to the above plan of care.\par \par \par \par \par

## 2023-06-19 ENCOUNTER — APPOINTMENT (OUTPATIENT)
Dept: INFUSION THERAPY | Facility: CLINIC | Age: 43
End: 2023-06-19

## 2023-06-19 ENCOUNTER — OUTPATIENT (OUTPATIENT)
Dept: OUTPATIENT SERVICES | Facility: HOSPITAL | Age: 43
LOS: 1 days | End: 2023-06-19
Payer: MEDICARE

## 2023-06-19 DIAGNOSIS — Z86.2 PERSONAL HISTORY OF DISEASES OF THE BLOOD AND BLOOD-FORMING ORGANS AND CERTAIN DISORDERS INVOLVING THE IMMUNE MECHANISM: ICD-10-CM

## 2023-06-19 DIAGNOSIS — Z90.89 ACQUIRED ABSENCE OF OTHER ORGANS: Chronic | ICD-10-CM

## 2023-06-19 DIAGNOSIS — D50.9 IRON DEFICIENCY ANEMIA, UNSPECIFIED: ICD-10-CM

## 2023-06-19 DIAGNOSIS — Z78.9 OTHER SPECIFIED HEALTH STATUS: ICD-10-CM

## 2023-06-19 DIAGNOSIS — Z87.891 PERSONAL HISTORY OF NICOTINE DEPENDENCE: ICD-10-CM

## 2023-06-19 DIAGNOSIS — Z86.39 PERSONAL HISTORY OF OTHER ENDOCRINE, NUTRITIONAL AND METABOLIC DISEASE: ICD-10-CM

## 2023-06-19 DIAGNOSIS — Z98.890 OTHER SPECIFIED POSTPROCEDURAL STATES: Chronic | ICD-10-CM

## 2023-06-19 DIAGNOSIS — D50.8 OTHER IRON DEFICIENCY ANEMIAS: ICD-10-CM

## 2023-06-19 DIAGNOSIS — D64.9 ANEMIA, UNSPECIFIED: ICD-10-CM

## 2023-06-19 PROCEDURE — 96365 THER/PROPH/DIAG IV INF INIT: CPT

## 2023-06-19 PROCEDURE — 96367 TX/PROPH/DG ADDL SEQ IV INF: CPT

## 2023-06-19 RX ORDER — FERUMOXYTOL 510 MG/17ML
510 INJECTION INTRAVENOUS ONCE
Refills: 0 | Status: COMPLETED | OUTPATIENT
Start: 2023-06-19 | End: 2023-06-19

## 2023-06-19 RX ORDER — HYDROCORTISONE 20 MG
100 TABLET ORAL ONCE
Refills: 0 | Status: COMPLETED | OUTPATIENT
Start: 2023-06-19 | End: 2023-06-19

## 2023-06-19 RX ADMIN — Medication 100 MILLIGRAM(S): at 10:49

## 2023-06-19 RX ADMIN — FERUMOXYTOL 510 MILLIGRAM(S): 510 INJECTION INTRAVENOUS at 11:40

## 2023-06-19 RX ADMIN — FERUMOXYTOL 234 MILLIGRAM(S): 510 INJECTION INTRAVENOUS at 11:10

## 2023-06-19 RX ADMIN — Medication 100 MILLIGRAM(S): at 11:10

## 2023-09-08 ENCOUNTER — LABORATORY RESULT (OUTPATIENT)
Age: 43
End: 2023-09-08

## 2023-09-08 ENCOUNTER — OUTPATIENT (OUTPATIENT)
Dept: OUTPATIENT SERVICES | Facility: HOSPITAL | Age: 43
LOS: 1 days | End: 2023-09-08
Payer: MEDICARE

## 2023-09-08 ENCOUNTER — APPOINTMENT (OUTPATIENT)
Dept: HEMATOLOGY ONCOLOGY | Facility: CLINIC | Age: 43
End: 2023-09-08

## 2023-09-08 DIAGNOSIS — Z90.89 ACQUIRED ABSENCE OF OTHER ORGANS: Chronic | ICD-10-CM

## 2023-09-08 DIAGNOSIS — D50.9 IRON DEFICIENCY ANEMIA, UNSPECIFIED: ICD-10-CM

## 2023-09-08 DIAGNOSIS — Z98.890 OTHER SPECIFIED POSTPROCEDURAL STATES: Chronic | ICD-10-CM

## 2023-09-08 LAB
HCT VFR BLD CALC: 45.3 %
HGB BLD-MCNC: 14.5 G/DL
MCHC RBC-ENTMCNC: 28 PG
MCHC RBC-ENTMCNC: 32 G/DL
MCV RBC AUTO: 87.6 FL
PLATELET # BLD AUTO: 279 K/UL
PMV BLD: 10 FL
RBC # BLD: 5.17 M/UL
RBC # FLD: 15.8 %
WBC # FLD AUTO: 6.85 K/UL

## 2023-09-08 PROCEDURE — 85027 COMPLETE CBC AUTOMATED: CPT

## 2023-09-08 PROCEDURE — 82728 ASSAY OF FERRITIN: CPT

## 2023-09-08 PROCEDURE — 36415 COLL VENOUS BLD VENIPUNCTURE: CPT

## 2023-09-09 DIAGNOSIS — D50.9 IRON DEFICIENCY ANEMIA, UNSPECIFIED: ICD-10-CM

## 2023-09-11 ENCOUNTER — OUTPATIENT (OUTPATIENT)
Dept: OUTPATIENT SERVICES | Facility: HOSPITAL | Age: 43
LOS: 1 days | End: 2023-09-11
Payer: MEDICARE

## 2023-09-11 ENCOUNTER — APPOINTMENT (OUTPATIENT)
Dept: HEMATOLOGY ONCOLOGY | Facility: CLINIC | Age: 43
End: 2023-09-11
Payer: MEDICARE

## 2023-09-11 VITALS
WEIGHT: 198 LBS | BODY MASS INDEX: 31.08 KG/M2 | RESPIRATION RATE: 18 BRPM | OXYGEN SATURATION: 98 % | HEART RATE: 80 BPM | HEIGHT: 67 IN | DIASTOLIC BLOOD PRESSURE: 85 MMHG | SYSTOLIC BLOOD PRESSURE: 190 MMHG

## 2023-09-11 DIAGNOSIS — Z98.890 OTHER SPECIFIED POSTPROCEDURAL STATES: Chronic | ICD-10-CM

## 2023-09-11 DIAGNOSIS — D50.9 IRON DEFICIENCY ANEMIA, UNSPECIFIED: ICD-10-CM

## 2023-09-11 DIAGNOSIS — Z90.89 ACQUIRED ABSENCE OF OTHER ORGANS: Chronic | ICD-10-CM

## 2023-09-11 LAB — FERRITIN SERPL-MCNC: 21 NG/ML

## 2023-09-11 PROCEDURE — 99213 OFFICE O/P EST LOW 20 MIN: CPT

## 2023-10-05 ENCOUNTER — NON-APPOINTMENT (OUTPATIENT)
Age: 43
End: 2023-10-05

## 2023-11-13 ENCOUNTER — APPOINTMENT (OUTPATIENT)
Dept: HEMATOLOGY ONCOLOGY | Facility: CLINIC | Age: 43
End: 2023-11-13

## 2023-11-19 ENCOUNTER — NON-APPOINTMENT (OUTPATIENT)
Age: 43
End: 2023-11-19

## 2024-01-16 ENCOUNTER — APPOINTMENT (OUTPATIENT)
Dept: HEMATOLOGY ONCOLOGY | Facility: CLINIC | Age: 44
End: 2024-01-16

## 2024-06-27 ENCOUNTER — NON-APPOINTMENT (OUTPATIENT)
Age: 44
End: 2024-06-27

## 2025-06-17 NOTE — H&P PST ADULT - PAIN SCALE PREFERRED, PROFILE
BP is at goal.   Current Rx: Bumex 2 mg BID, nifedipine 60 mg daily.  No changes.      numerical 0-10

## 2025-06-20 ENCOUNTER — APPOINTMENT (OUTPATIENT)
Age: 45
End: 2025-06-20